# Patient Record
Sex: FEMALE | Race: WHITE | NOT HISPANIC OR LATINO
[De-identification: names, ages, dates, MRNs, and addresses within clinical notes are randomized per-mention and may not be internally consistent; named-entity substitution may affect disease eponyms.]

---

## 2020-02-10 ENCOUNTER — RESULT REVIEW (OUTPATIENT)
Age: 70
End: 2020-02-10

## 2021-03-28 DIAGNOSIS — Z01.818 ENCOUNTER FOR OTHER PREPROCEDURAL EXAMINATION: ICD-10-CM

## 2021-03-29 ENCOUNTER — APPOINTMENT (OUTPATIENT)
Dept: DISASTER EMERGENCY | Facility: CLINIC | Age: 71
End: 2021-03-29

## 2021-03-30 LAB — SARS-COV-2 N GENE NPH QL NAA+PROBE: NOT DETECTED

## 2021-07-21 ENCOUNTER — NON-APPOINTMENT (OUTPATIENT)
Age: 71
End: 2021-07-21

## 2021-07-21 ENCOUNTER — APPOINTMENT (OUTPATIENT)
Dept: INFECTIOUS DISEASE | Facility: CLINIC | Age: 71
End: 2021-07-21
Payer: MEDICARE

## 2021-07-21 VITALS
HEIGHT: 60 IN | TEMPERATURE: 99.1 F | WEIGHT: 162 LBS | BODY MASS INDEX: 31.8 KG/M2 | HEART RATE: 74 BPM | SYSTOLIC BLOOD PRESSURE: 130 MMHG | RESPIRATION RATE: 15 BRPM | DIASTOLIC BLOOD PRESSURE: 88 MMHG | OXYGEN SATURATION: 95 %

## 2021-07-21 DIAGNOSIS — E78.5 HYPERLIPIDEMIA, UNSPECIFIED: ICD-10-CM

## 2021-07-21 DIAGNOSIS — A04.72 ENTEROCOLITIS DUE TO CLOSTRIDIUM DIFFICILE, NOT SPECIFIED AS RECURRENT: ICD-10-CM

## 2021-07-21 DIAGNOSIS — M19.90 UNSPECIFIED OSTEOARTHRITIS, UNSPECIFIED SITE: ICD-10-CM

## 2021-07-21 DIAGNOSIS — R79.9 ABNORMAL FINDING OF BLOOD CHEMISTRY, UNSPECIFIED: ICD-10-CM

## 2021-07-21 DIAGNOSIS — B96.20 BACTEREMIA: ICD-10-CM

## 2021-07-21 DIAGNOSIS — R65.20 SEPSIS, UNSPECIFIED ORGANISM: ICD-10-CM

## 2021-07-21 DIAGNOSIS — R78.81 BACTEREMIA: ICD-10-CM

## 2021-07-21 DIAGNOSIS — A41.9 SEPSIS, UNSPECIFIED ORGANISM: ICD-10-CM

## 2021-07-21 PROCEDURE — 99213 OFFICE O/P EST LOW 20 MIN: CPT

## 2021-07-21 RX ORDER — LACTOBACILLUS ACIDOPHILUS/PECT 30 MG-20MG
TABLET ORAL
Refills: 0 | Status: ACTIVE | COMMUNITY

## 2021-07-21 RX ORDER — ASCORBIC ACID 500 MG
TABLET ORAL
Refills: 0 | Status: ACTIVE | COMMUNITY

## 2021-07-21 RX ORDER — HYDRALAZINE HYDROCHLORIDE 50 MG/1
50 TABLET ORAL
Refills: 0 | Status: ACTIVE | COMMUNITY

## 2021-07-21 RX ORDER — FLUTICASONE PROPIONATE 50 UG/1
50 SPRAY, METERED NASAL
Refills: 0 | Status: ACTIVE | COMMUNITY

## 2021-07-21 RX ORDER — ATORVASTATIN CALCIUM 10 MG/1
10 TABLET, FILM COATED ORAL
Refills: 0 | Status: ACTIVE | COMMUNITY

## 2021-07-21 RX ORDER — FOLIC ACID 20 MG
CAPSULE ORAL
Refills: 0 | Status: ACTIVE | COMMUNITY

## 2021-07-21 RX ORDER — THIAMINE HCL 50 MG
TABLET ORAL
Refills: 0 | Status: ACTIVE | COMMUNITY

## 2021-07-21 RX ORDER — GLUC/MSM/COLGN2/HYAL/ANTIARTH3 375-375-20
TABLET ORAL
Refills: 0 | Status: ACTIVE | COMMUNITY

## 2021-07-21 RX ORDER — CHOLECALCIFEROL (VITAMIN D3) 25 MCG
TABLET ORAL
Refills: 0 | Status: ACTIVE | COMMUNITY

## 2021-07-21 RX ORDER — SUCRALFATE 1 G/1
TABLET ORAL
Refills: 0 | Status: ACTIVE | COMMUNITY

## 2021-07-21 RX ORDER — BACILLUS COAGULANS/INULIN 1B-250 MG
CAPSULE ORAL
Refills: 0 | Status: ACTIVE | COMMUNITY

## 2021-07-21 RX ORDER — ZOLPIDEM TARTRATE 5 MG/1
TABLET, FILM COATED ORAL
Refills: 0 | Status: ACTIVE | COMMUNITY

## 2021-07-21 RX ORDER — BUDESONIDE AND FORMOTEROL FUMARATE DIHYDRATE 160; 4.5 UG/1; UG/1
AEROSOL RESPIRATORY (INHALATION)
Refills: 0 | Status: ACTIVE | COMMUNITY

## 2021-07-21 NOTE — ASSESSMENT
[FreeTextEntry1] : 70 y/o woman s/p recent admission with E. Coli bacteremia secondary to a UTI - completed Cipro \par Relapsed recurrent C diff -- completed Dificid \par Asymptomatic- feels well \par Blood work - with mildly elevated LFTs and creatinine - done by her PCP \par \par Rec:\par \par 1. Repeat blood work \par 2. No further Abx needed at this time \par 3. watch for  C diff recurrence with future Abx use \par \par plan above d/w patient \par

## 2021-07-21 NOTE — REASON FOR VISIT
[Post Hospitalization] : a post hospitalization visit [FreeTextEntry1] : fu from hospital stay ([facility]) for sepsis\par PO Cipro/Dificid completed last week \par pt only c/o fatigue\par saw PCP and had bw done

## 2021-07-21 NOTE — HISTORY OF PRESENT ILLNESS
[FreeTextEntry1] : PAtient here for hospital follow up \par \par Discharged on po cipro and Dificid -- completed both last week \par \par feels well \par offers no complaints\par \par No diarrhea \par No urinary complaints \par \par Had blood work done at her PCP's office was concerned about mildly elevated creatinine, LFTs, anemia \par

## 2021-07-21 NOTE — PHYSICAL EXAM
[General Appearance - Alert] : alert [General Appearance - In No Acute Distress] : in no acute distress [General Appearance - Well Nourished] : well nourished [Sclera] : the sclera and conjunctiva were normal [Extraocular Movements] : extraocular movements were intact [Outer Ear] : the ears and nose were normal in appearance [Examination Of The Oral Cavity] : the lips and gums were normal [Neck Appearance] : the appearance of the neck was normal [Respiration, Rhythm And Depth] : normal respiratory rhythm and effort [Auscultation Breath Sounds / Voice Sounds] : lungs were clear to auscultation bilaterally [Heart Rate And Rhythm] : heart rate was normal and rhythm regular [Heart Sounds] : normal S1 and S2 [Edema] : there was no peripheral edema [Bowel Sounds] : normal bowel sounds [Abdomen Tenderness] : non-tender [Abdomen Soft] : soft [Costovertebral Angle Tenderness] : no CVA tenderness [Musculoskeletal - Swelling] : no joint swelling [Range of Motion to Joints] : range of motion to joints [Skin Color & Pigmentation] : normal skin color and pigmentation [] : no rash [Sensation] : the sensory exam was normal to light touch and pinprick [Motor Exam] : the motor exam was normal [Oriented To Time, Place, And Person] : oriented to person, place, and time [Affect] : the affect was normal

## 2022-04-08 ENCOUNTER — APPOINTMENT (OUTPATIENT)
Dept: ORTHOPEDIC SURGERY | Facility: CLINIC | Age: 72
End: 2022-04-08
Payer: MEDICARE

## 2022-04-08 VITALS — WEIGHT: 162 LBS | HEIGHT: 60 IN | BODY MASS INDEX: 31.8 KG/M2

## 2022-04-08 DIAGNOSIS — R22.42 LOCALIZED SWELLING, MASS AND LUMP, LEFT LOWER LIMB: ICD-10-CM

## 2022-04-08 DIAGNOSIS — R60.9 EDEMA, UNSPECIFIED: ICD-10-CM

## 2022-04-08 PROCEDURE — 99214 OFFICE O/P EST MOD 30 MIN: CPT

## 2022-04-08 NOTE — DISCUSSION/SUMMARY
[Medication Risks Reviewed] : Medication risks reviewed [de-identified] : 71-year-old female who is now more than one year removed from left reverse shoulder arthroplasty was done as a revision.\par Patient had some pain after surgery more than I would expect and a diffuse degenerative cervical issue was identified.\par Patient has been seeing Dr. Frankenberger for epidural injections and has had no alleviation of pain\par She will continue to follow up with Dr. Frankenberger\par \par With respect to her left knee, patient c/o of a constant and severe pain\par Previous glenohumeral injection provided moderate relief for the patient\par Previous series of orthovisc injection provided moderate relief.\par Patient will follow up as needed for her shoulder.\par \par She presents today with a new onset of left foot swelling x1 week. Denies any specific or injury\par Patient was seen by Jimmy JONES, her PCP, and Dr. Frankenberger all with a negative workup\par She has been ruled out for DVT. Discussed could be Lyme disease and to check battery of tests from hospital\par She denies any pain when walking or weight bearing.\par Recommended to wear a compression sleeve for her foot/ankle, continue to elevate her foot and move her ankle\par Follow up as needed \par \par \par \par (1) We discussed a comprehensive treatment plans that included a prescription management plan involving the use of prescription strength medications to include Ibuprofen 600-800 mg TID, versus 500-650 mg Tylenol. We also discussed prescribing topical diclofenac (Voltaren gel) as well as once daily Meloxicam 15 mg.\par (2) The patient has More Than One chronic injuries/illnesses as outlined, discussed, and documented by ICD 10 codes listed, as well as the HPI and Plan section.\par There is a moderate risk of morbidity with further treatment, especially from use of prescription strength medications and possible side effects of these medications which include upset stomach and cardiac/renal issues with long term use were discussed.\par (3) I recommended that the patient follow-up with their medical physician to discuss any significant specific potential issues with long term use such as interactions with current medications or with exacerbation of underlying medical morbidities. \par \par Attestation:\par I, Dana Jessica , attest that this documentation has been prepared under the direction and in the presence of Provider Huber Bain MD.\par The documentation recorded by the scribe, in my presence, accurately reflects the service I personally performed, and the decisions made by me with my edits as appropriate.\par Huber Bain MD\par \par \par

## 2022-04-08 NOTE — PHYSICAL EXAM
[de-identified] : Constitutional: The general appearance of the patient is well developed, well nourished, no deformities and well groomed. Normal \par \par Gait: Gait and function is as follows: normal gait. \par \par Skin: Head and neck visualized skin is normal. Left upper extremity visualized skin is normal. Right upper extremity visualized skin is normal. Thoracic Skin of the thoracic spine shows visualized skin is normal. \par \par Cardiovascular: palpable radial pulse bilaterally, good capillary refill in digits of the bilateral upper extremities and no temperature or color changes in the bilateral upper extremities. \par \par Lymphatic: Normal Palpation of lymph nodes in the cervical. \par \par Neurologic: fine motor control in the bilateral upper extremities is intact. Deep Tendon Reflexes in Upper and Lower Extremities Negative Mathew's in the bilateral upper extremities. The patient is oriented to time, place and person. Sensation to light touch intact in the bilateral upper extremities. Mood and Affect is normal. \par \par Left Knee: Inspection of the knee is as follows: mild effusion. no ecchymosis and no streaking. Palpation of the knee is as follows: medial joint line tenderness and patella tendon tenderness. Knee Range of Motion is as follows: Range of motion is limited secondary to pain. Strength examination of the knee is as follows: Quadriceps strength is 5/5 Hamstring strength is 5/5 Ligament Stability and Special Test ligamentously stable. positive mcmurrays. Neurological examination of the knee is as follows: light touch is intact throughout. Gait and function is as follows: normal gait\par Right Shoulder:  Inspection of the shoulder/upper arm is as follows: There is a full, pain-free, stable range of motion of the shoulder with normal strength and no tenderness to palpation. \par \par Left Shoulder: Inspection of the shoulder/upper arm is as follows: no scapula winging, no biceps deformity and no AC joint deformity. Palpation of the shoulder/upper arm is as follows: There is tenderness at the proximal biceps tendon. Range of motion of the shoulder is as follows: Pain with internal rotation, external rotation, abduction and forward flexion. Strength of the shoulder is as follows: Supraspinatus 4/5. External Rotation 4/5. Internal Rotation 4/5. Deltoid 5/5 Ligament Stability and Special Tests of the shoulder is as follows: Neer test is positive. Anderson' test is positive. Speed's test is positive. \par \par Neck: \par Inspection / Palpation of the cervical spine is as follows: There is a full, pain free, stable range of motion of the cervical spine with normal tone and no tenderness to palpation. \par \par Back, including spine: Inspection / Palpation of the thoracic/lumbar spine is as follows: There is a full, pain free, stable range of motion of the thoracic spine with a normal tone and not tenderness to palpation.. [Left] : left foot and ankle [] : no gross deformity [Moderate] : moderate diffused ankle swelling

## 2022-04-08 NOTE — HISTORY OF PRESENT ILLNESS
[de-identified] : 70-year-old female who underwent reverse shoulder arthroplasty proximally 5 months ago. She had 2 previous rotator cuff repairs by Dr. Etienne which restored. Patient reports that prior to her reverse shoulder arthroplasty she had pain at rest and severe pain with trying to use the arm.\par Pain did not radiate into the hands and fingers. Patient had no diagnosis of herniated disc in the cervical region.\par Patient notes significant pain after the reverse shoulder arthroplasty which has been getting significantly worse.\par ESR and CRP were within normal limits. She denies fevers sweats chills erythema or wound dehiscence issues.\par Patient's  who is a personal friend brought her in for a second opinion regarding the etiology of her pain. Of note she did have a CT scan done which was read as posterior inferior glenoid fracture as well as lateral calcar humerus fracture.\par  The patient is Right handed.\par 7/28/20: patient returns today for follow up of ongoing left shoulder pain. Her pain is now constant and severe. She continues to report difficulty with activities of daily living. SHe presents to discuss surgery.\par 8/14/20: patient presents 10 days s/o left revision reverse shoulder arthroplasty with removal of previous rotator cuff anchor.\par \par 8/28/20: patient presents 4 weeks s/o left revision reverse shoulder arthroplasty with removal of previous rotator cuff anchor. The patient is doing well post operatively. She also presents with additional complaint of left knee pain. She has history of OA and reports recent exacerbation over the last 2-3 weeks. Pain is worse with walking long distances.\par 9/11/20: Patient returns today for follow up of left knee pain. Previous cortisone injection did not provide significant relief. She continues to report difficulty. She is interested in discussing further treatment options.\par patient is also recovering from left revision reverse shoulder arthroplasty. Some discomfort in the anterolateral aspect of the shoulder.\par 9/18/20: Patient returns today for follow up of left knee pain. She reports mild relief thus far from the first of her lubricating injections. She presents today for the 2nd of those injections.\par 9/25/20: Patient returns today for follow up of left knee pain. She reports mild relief thus far from her lubricating injections. She presents today for the 3rd of those injections.\par 10/5/20: Patient returns today for follow up regarding her neck and left shoulder pain. She denies any discrete trauma. She reports a significant atraumatic exacerbation of her pain after the weekend. She has been wearing a sling for immobilization of her LUE.\par \par 2/26/21: 6 months status post revision left reverse arthroplasty doing well. Also bilateral knee pain.\par She had an exacerbation of superior and lateral pain. She is exacerbation of neck pain and radicular symptoms.\par \par 5/24/21: Patient presents today for evaluation for recent left knee exacerbation. patient was last treated with orthovisc more than 6 months ago. She did report moderate improvement. Currently pain is constant. Pain is to medial aspect of her knee.\par \par 5/28/21: patient reports Orthovisc to her left knee last visit helped. She is here to discuss the possibility of a second injection.\par \par 6/4/21: patient presents to discuss her ongoing worsening left knee pain. She is here to discuss the possibility of proceeding with third Orthovisc injection to left knee.\par \par 8/9/21:patient reports that she was in the hospital with sepsis. Even prior to that however her shoulder has become increasingly more painful in the anterior aspect of the shoulder. She did have dramatic relief with prior epidural steroid injection.\par \par 8/16/21: patient reports a flareup of pain just lateral to the sternum on the left side I got worse with therapy. She is moving her left shoulder fluidly.\par \par 12/3/21: Patient returns with left knee pain. States a constant and severe pain. With respect to her neck and shoulder, she has been seeing Dr. Frankenberger for epidural injections and has no alleviation of pain.\par \par 12/10/21: Patient presents with ongoing left knee pain. Previous glenohumeral injection to her right knee provided moderate relief. She is here to discuss series of orthovisc injections .\par \par 12/17/21: patient presents with ongoing left knee pain. Previous orthovisc injection provided mild relief for the patient. She is here to discuss second orthovisc injections.\par \par 12/27/21: Patient presents with ongoing left knee pain. her previous orthovisc injection provided moderate relief for the patient. States no negative reactions. She is here to discuss third orthovisc injection.\par \par 4/8/22: Patient presents with new onset of left foot swelling x1 week. She denies any pain with walking or weight bearing. She was seen at [facility], by her PCP and Dr. Frankenberger who could not diagnose her left foot swelling. No swelling to her right foot. She soaked her foot in epsom salt and elevated it with no relief.

## 2022-05-06 ENCOUNTER — APPOINTMENT (OUTPATIENT)
Dept: ORTHOPEDIC SURGERY | Facility: CLINIC | Age: 72
End: 2022-05-06
Payer: MEDICARE

## 2022-05-06 VITALS — HEIGHT: 60 IN | BODY MASS INDEX: 31.8 KG/M2 | WEIGHT: 162 LBS

## 2022-05-06 PROCEDURE — 20611 DRAIN/INJ JOINT/BURSA W/US: CPT | Mod: LT

## 2022-05-06 PROCEDURE — 99214 OFFICE O/P EST MOD 30 MIN: CPT | Mod: 25,57

## 2022-05-06 NOTE — HISTORY OF PRESENT ILLNESS
[de-identified] : 70-year-old female who underwent reverse shoulder arthroplasty proximally 5 months ago. She had 2 previous rotator cuff repairs by Dr. Etienne which restored. Patient reports that prior to her reverse shoulder arthroplasty she had pain at rest and severe pain with trying to use the arm.\par Pain did not radiate into the hands and fingers. Patient had no diagnosis of herniated disc in the cervical region.\par Patient notes significant pain after the reverse shoulder arthroplasty which has been getting significantly worse.\par ESR and CRP were within normal limits. She denies fevers sweats chills erythema or wound dehiscence issues.\par Patient's  who is a personal friend brought her in for a second opinion regarding the etiology of her pain. Of note she did have a CT scan done which was read as posterior inferior glenoid fracture as well as lateral calcar humerus fracture.\par  The patient is Right handed.\par 7/28/20: patient returns today for follow up of ongoing left shoulder pain. Her pain is now constant and severe. She continues to report difficulty with activities of daily living. SHe presents to discuss surgery.\par 8/14/20: patient presents 10 days s/o left revision reverse shoulder arthroplasty with removal of previous rotator cuff anchor.\par \par 8/28/20: patient presents 4 weeks s/o left revision reverse shoulder arthroplasty with removal of previous rotator cuff anchor. The patient is doing well post operatively. She also presents with additional complaint of left knee pain. She has history of OA and reports recent exacerbation over the last 2-3 weeks. Pain is worse with walking long distances.\par 9/11/20: Patient returns today for follow up of left knee pain. Previous cortisone injection did not provide significant relief. She continues to report difficulty. She is interested in discussing further treatment options.\par patient is also recovering from left revision reverse shoulder arthroplasty. Some discomfort in the anterolateral aspect of the shoulder.\par 9/18/20: Patient returns today for follow up of left knee pain. She reports mild relief thus far from the first of her lubricating injections. She presents today for the 2nd of those injections.\par 9/25/20: Patient returns today for follow up of left knee pain. She reports mild relief thus far from her lubricating injections. She presents today for the 3rd of those injections.\par 10/5/20: Patient returns today for follow up regarding her neck and left shoulder pain. She denies any discrete trauma. She reports a significant atraumatic exacerbation of her pain after the weekend. She has been wearing a sling for immobilization of her LUE.\par \par 2/26/21: 6 months status post revision left reverse arthroplasty doing well. Also bilateral knee pain.\par She had an exacerbation of superior and lateral pain. She is exacerbation of neck pain and radicular symptoms.\par \par 5/24/21: Patient presents today for evaluation for recent left knee exacerbation. patient was last treated with orthovisc more than 6 months ago. She did report moderate improvement. Currently pain is constant. Pain is to medial aspect of her knee.\par \par 5/28/21: patient reports Orthovisc to her left knee last visit helped. She is here to discuss the possibility of a second injection.\par \par 6/4/21: patient presents to discuss her ongoing worsening left knee pain. She is here to discuss the possibility of proceeding with third Orthovisc injection to left knee.\par \par 8/9/21:patient reports that she was in the hospital with sepsis. Even prior to that however her shoulder has become increasingly more painful in the anterior aspect of the shoulder. She did have dramatic relief with prior epidural steroid injection.\par \par 8/16/21: patient reports a flareup of pain just lateral to the sternum on the left side I got worse with therapy. She is moving her left shoulder fluidly.\par \par 12/3/21: Patient returns with left knee pain. States a constant and severe pain. With respect to her neck and shoulder, she has been seeing Dr. Frankenberger for epidural injections and has no alleviation of pain.\par \par 12/10/21: Patient presents with ongoing left knee pain. Previous glenohumeral injection to her right knee provided moderate relief. She is here to discuss series of orthovisc injections .\par \par 12/17/21: patient presents with ongoing left knee pain. Previous orthovisc injection provided mild relief for the patient. She is here to discuss second orthovisc injections.\par \par 12/27/21: Patient presents with ongoing left knee pain. her previous orthovisc injection provided moderate relief for the patient. States no negative reactions. She is here to discuss third orthovisc injection.\par \par 4/8/22: Patient presents with new onset of left foot swelling x1 week. She denies any pain with walking or weight bearing. She was seen at [facility], by her PCP and Dr. Frankenberger who could not diagnose her left foot swelling. No swelling to her right foot. She soaked her foot in epsom salt and elevated it with no relief.

## 2022-05-06 NOTE — DISCUSSION/SUMMARY
[Medication Risks Reviewed] : Medication risks reviewed [de-identified] : 71-year-old female who is now more than one year removed from left reverse shoulder arthroplasty was done as a revision.\par Patient had some pain after surgery more than I would expect and a diffuse degenerative cervical issue was identified.\par Patient has been seeing Dr. Frankenberger for epidural injections and has had no alleviation of pain\par She will continue to follow up with Dr. Frankenberger\par Patient will follow up as needed for her shoulder.\par \par With respect to her left knee, patient c/o of a constant and severe pain\par Previous series of orthovisc injection provided moderate relief.\par Patient was treated today with US guided cortisone injection for diagnostic and therapeutic purposes\par Follow up as needed\par \par \par (1) We discussed a comprehensive treatment plans that included a prescription management plan involving the use of prescription strength medications to include Ibuprofen 600-800 mg TID, versus 500-650 mg Tylenol. We also discussed prescribing topical diclofenac (Voltaren gel) as well as once daily Meloxicam 15 mg.\par (2) The patient has More Than One chronic injuries/illnesses as outlined, discussed, and documented by ICD 10 codes listed, as well as the HPI and Plan section.\par There is a moderate risk of morbidity with further treatment, especially from use of prescription strength medications and possible side effects of these medications which include upset stomach and cardiac/renal issues with long term use were discussed.\par (3) I recommended that the patient follow-up with their medical physician to discuss any significant specific potential issues with long term use such as interactions with current medications or with exacerbation of underlying medical morbidities. \par \par Attestation:\par I, Dana Jessica , attest that this documentation has been prepared under the direction and in the presence of Provider Huber Bain MD.\par The documentation recorded by the scribe, in my presence, accurately reflects the service I personally performed, and the decisions made by me with my edits as appropriate.\par Huber Bain MD\par \par \par

## 2022-05-06 NOTE — PHYSICAL EXAM
[de-identified] : Constitutional: The general appearance of the patient is well developed, well nourished, no deformities and well groomed. Normal \par \par Gait: Gait and function is as follows: normal gait. \par \par Skin: Head and neck visualized skin is normal. Left upper extremity visualized skin is normal. Right upper extremity visualized skin is normal. Thoracic Skin of the thoracic spine shows visualized skin is normal. \par \par Cardiovascular: palpable radial pulse bilaterally, good capillary refill in digits of the bilateral upper extremities and no temperature or color changes in the bilateral upper extremities. \par \par Lymphatic: Normal Palpation of lymph nodes in the cervical. \par \par Neurologic: fine motor control in the bilateral upper extremities is intact. Deep Tendon Reflexes in Upper and Lower Extremities Negative Mathew's in the bilateral upper extremities. The patient is oriented to time, place and person. Sensation to light touch intact in the bilateral upper extremities. Mood and Affect is normal. \par \par Left Knee: Inspection of the knee is as follows: mild effusion. no ecchymosis and no streaking. Palpation of the knee is as follows: medial joint line tenderness and patella tendon tenderness. Knee Range of Motion is as follows: Range of motion is limited secondary to pain. Strength examination of the knee is as follows: Quadriceps strength is 5/5 Hamstring strength is 5/5 Ligament Stability and Special Test ligamentously stable. positive mcmurrays. Neurological examination of the knee is as follows: light touch is intact throughout. Gait and function is as follows: normal gait\par Right Shoulder:  Inspection of the shoulder/upper arm is as follows: There is a full, pain-free, stable range of motion of the shoulder with normal strength and no tenderness to palpation. \par \par Left Shoulder: Inspection of the shoulder/upper arm is as follows: no scapula winging, no biceps deformity and no AC joint deformity. Palpation of the shoulder/upper arm is as follows: There is tenderness at the proximal biceps tendon. Range of motion of the shoulder is as follows: Pain with internal rotation, external rotation, abduction and forward flexion. Strength of the shoulder is as follows: Supraspinatus 4/5. External Rotation 4/5. Internal Rotation 4/5. Deltoid 5/5 Ligament Stability and Special Tests of the shoulder is as follows: Neer test is positive. Anderson' test is positive. Speed's test is positive. \par \par Neck: \par Inspection / Palpation of the cervical spine is as follows: There is a full, pain free, stable range of motion of the cervical spine with normal tone and no tenderness to palpation. \par \par Back, including spine: Inspection / Palpation of the thoracic/lumbar spine is as follows: There is a full, pain free, stable range of motion of the thoracic spine with a normal tone and not tenderness to palpation..

## 2022-05-06 NOTE — PROCEDURE
[Large Joint Injection] : Large joint injection [Left] : of the left [Knee] : knee [FreeTextEntry3] : Large Joint Injection was performed because of pain and inflammation. Anesthesia: ethyl chloride sprayed topically.. \par Depomedrol: An injection of Depomedrol 40 mg , 1 cc. \par Lidocaine: 4 cc. \par \par Medication was injected in the left knee. Patient has tried OTC's including aspirin, Ibuprofen, Aleve etc or prescription NSAIDS, and/or exercises at home and/ or physical therapy without satisfactory response and Patient has decreased mobility in the joint. After verbal consent using sterile preparation and technique. The risks, benefits, and alternatives to cortisone injection were explained in full to the patient. Risks outlined include but are not limited to infection, sepsis, bleeding, scarring, skin discoloration, temporary increase in pain, syncopal episode, failure to resolve symptoms, allergic reaction, symptom recurrence, and elevation of blood sugar in diabetics. Patient understood the risks. All questions were answered. After discussion of options, patient requested an injection. Oral informed consent was obtained and sterile prep was done of the injection site. Sterile technique was utilized for the procedure including the preparation of the solutions used for the injection. Patient tolerated the procedure well. Advised to ice the injection site this evening. Prep with alcohol locally to site. Sterile technique used. Patient tolerated procedure well. Post Procedure Instructions: Patient was advised to call if redness, pain, or fever occur and apply ice for 15 min. out of every hour for the next 12-24 hours as tolerated. patient was advised to rest the joint(s) for 7 days. \par \par \par \par Ultrasound Guidance was used for the following reasons: for precise injection in area of tear and prior failure or difficult injection. \par \par \par \par Ultrasound guided injection was performed of the knee, visualization of the needle and placement of injection was performed without complication. \par \par \par

## 2022-06-27 ENCOUNTER — APPOINTMENT (OUTPATIENT)
Dept: ORTHOPEDIC SURGERY | Facility: CLINIC | Age: 72
End: 2022-06-27

## 2022-06-27 VITALS — HEIGHT: 60 IN | WEIGHT: 162 LBS | BODY MASS INDEX: 31.8 KG/M2

## 2022-06-27 PROCEDURE — 99214 OFFICE O/P EST MOD 30 MIN: CPT | Mod: 57,25

## 2022-06-27 PROCEDURE — 20611 DRAIN/INJ JOINT/BURSA W/US: CPT | Mod: LT

## 2022-06-27 NOTE — DISCUSSION/SUMMARY
[Medication Risks Reviewed] : Medication risks reviewed [de-identified] : 71-year-old female who is now more than one year removed from left reverse shoulder arthroplasty was done as a revision.\par Patient had some pain after surgery more than I would expect and a diffuse degenerative cervical issue was identified.\par Patient has been seeing Dr. Frankenberger for epidural injections and has had no alleviation of pain\par She will continue to follow up with Dr. Frankenberger\par Patient will follow up as needed for her shoulder.\par \par With respect to her left knee, patient c/o of a constant and severe pain\par Patient has experienced relief with previous series of orthovisc injection and cortisone injections\par After a long discussion was had with the patient outlining risks/benefits we decided to proceed with a series of orthovisc injections. \par Patient was treated today with injection #1 to the left knee. Tolerated the injection well. \par Advised to contact the office or presents to ER should they develop any swelling, erythema or fever. \par Patient will follow up in 1 week. \par \par \par \par (1) We discussed a comprehensive treatment plans that included a prescription management plan involving the use of prescription strength medications to include Ibuprofen 600-800 mg TID, versus 500-650 mg Tylenol. We also discussed prescribing topical diclofenac (Voltaren gel) as well as once daily Meloxicam 15 mg.\par (2) The patient has More Than One chronic injuries/illnesses as outlined, discussed, and documented by ICD 10 codes listed, as well as the HPI and Plan section.\par There is a moderate risk of morbidity with further treatment, especially from use of prescription strength medications and possible side effects of these medications which include upset stomach and cardiac/renal issues with long term use were discussed.\par (3) I recommended that the patient follow-up with their medical physician to discuss any significant specific potential issues with long term use such as interactions with current medications or with exacerbation of underlying medical morbidities. \par \par Attestation:\par I, Dana Jessica , attest that this documentation has been prepared under the direction and in the presence of Provider Huber Bain MD.\par The documentation recorded by the scribe, in my presence, accurately reflects the service I personally performed, and the decisions made by me with my edits as appropriate.\par Huber Bain MD\par \par \par

## 2022-06-27 NOTE — PHYSICAL EXAM
[de-identified] : Constitutional: The general appearance of the patient is well developed, well nourished, no deformities and well groomed. Normal \par \par Gait: Gait and function is as follows: normal gait. \par \par Skin: Head and neck visualized skin is normal. Left upper extremity visualized skin is normal. Right upper extremity visualized skin is normal. Thoracic Skin of the thoracic spine shows visualized skin is normal. \par \par Cardiovascular: palpable radial pulse bilaterally, good capillary refill in digits of the bilateral upper extremities and no temperature or color changes in the bilateral upper extremities. \par \par Lymphatic: Normal Palpation of lymph nodes in the cervical. \par \par Neurologic: fine motor control in the bilateral upper extremities is intact. Deep Tendon Reflexes in Upper and Lower Extremities Negative Mathew's in the bilateral upper extremities. The patient is oriented to time, place and person. Sensation to light touch intact in the bilateral upper extremities. Mood and Affect is normal. \par \par Left Knee: Inspection of the knee is as follows: mild effusion. no ecchymosis and no streaking. Palpation of the knee is as follows: medial joint line tenderness and patella tendon tenderness. Knee Range of Motion is as follows: Range of motion is limited secondary to pain. Strength examination of the knee is as follows: Quadriceps strength is 5/5 Hamstring strength is 5/5 Ligament Stability and Special Test ligamentously stable. positive mcmurrays. Neurological examination of the knee is as follows: light touch is intact throughout. Gait and function is as follows: normal gait\par Right Shoulder:  Inspection of the shoulder/upper arm is as follows: There is a full, pain-free, stable range of motion of the shoulder with normal strength and no tenderness to palpation. \par \par Left Shoulder: Inspection of the shoulder/upper arm is as follows: no scapula winging, no biceps deformity and no AC joint deformity. Palpation of the shoulder/upper arm is as follows: There is tenderness at the proximal biceps tendon. Range of motion of the shoulder is as follows: Pain with internal rotation, external rotation, abduction and forward flexion. Strength of the shoulder is as follows: Supraspinatus 4/5. External Rotation 4/5. Internal Rotation 4/5. Deltoid 5/5 Ligament Stability and Special Tests of the shoulder is as follows: Neer test is positive. Anderson' test is positive. Speed's test is positive. \par \par Neck: \par Inspection / Palpation of the cervical spine is as follows: There is a full, pain free, stable range of motion of the cervical spine with normal tone and no tenderness to palpation. \par \par Back, including spine: Inspection / Palpation of the thoracic/lumbar spine is as follows: There is a full, pain free, stable range of motion of the thoracic spine with a normal tone and not tenderness to palpation..

## 2022-06-27 NOTE — HISTORY OF PRESENT ILLNESS
[de-identified] : 70-year-old female who underwent reverse shoulder arthroplasty proximally 5 months ago. She had 2 previous rotator cuff repairs by Dr. Etienne which restored. Patient reports that prior to her reverse shoulder arthroplasty she had pain at rest and severe pain with trying to use the arm.\par Pain did not radiate into the hands and fingers. Patient had no diagnosis of herniated disc in the cervical region.\par Patient notes significant pain after the reverse shoulder arthroplasty which has been getting significantly worse.\par ESR and CRP were within normal limits. She denies fevers sweats chills erythema or wound dehiscence issues.\par Patient's  who is a personal friend brought her in for a second opinion regarding the etiology of her pain. Of note she did have a CT scan done which was read as posterior inferior glenoid fracture as well as lateral calcar humerus fracture.\par  The patient is Right handed.\par 7/28/20: patient returns today for follow up of ongoing left shoulder pain. Her pain is now constant and severe. She continues to report difficulty with activities of daily living. SHe presents to discuss surgery.\par 8/14/20: patient presents 10 days s/o left revision reverse shoulder arthroplasty with removal of previous rotator cuff anchor.\par \par 8/28/20: patient presents 4 weeks s/o left revision reverse shoulder arthroplasty with removal of previous rotator cuff anchor. The patient is doing well post operatively. She also presents with additional complaint of left knee pain. She has history of OA and reports recent exacerbation over the last 2-3 weeks. Pain is worse with walking long distances.\par 9/11/20: Patient returns today for follow up of left knee pain. Previous cortisone injection did not provide significant relief. She continues to report difficulty. She is interested in discussing further treatment options.\par patient is also recovering from left revision reverse shoulder arthroplasty. Some discomfort in the anterolateral aspect of the shoulder.\par 9/18/20: Patient returns today for follow up of left knee pain. She reports mild relief thus far from the first of her lubricating injections. She presents today for the 2nd of those injections.\par 9/25/20: Patient returns today for follow up of left knee pain. She reports mild relief thus far from her lubricating injections. She presents today for the 3rd of those injections.\par 10/5/20: Patient returns today for follow up regarding her neck and left shoulder pain. She denies any discrete trauma. She reports a significant atraumatic exacerbation of her pain after the weekend. She has been wearing a sling for immobilization of her LUE.\par \par 2/26/21: 6 months status post revision left reverse arthroplasty doing well. Also bilateral knee pain.\par She had an exacerbation of superior and lateral pain. She is exacerbation of neck pain and radicular symptoms.\par \par 5/24/21: Patient presents today for evaluation for recent left knee exacerbation. patient was last treated with orthovisc more than 6 months ago. She did report moderate improvement. Currently pain is constant. Pain is to medial aspect of her knee.\par \par 5/28/21: patient reports Orthovisc to her left knee last visit helped. She is here to discuss the possibility of a second injection.\par \par 6/4/21: patient presents to discuss her ongoing worsening left knee pain. She is here to discuss the possibility of proceeding with third Orthovisc injection to left knee.\par \par 8/9/21:patient reports that she was in the hospital with sepsis. Even prior to that however her shoulder has become increasingly more painful in the anterior aspect of the shoulder. She did have dramatic relief with prior epidural steroid injection.\par \par 8/16/21: patient reports a flareup of pain just lateral to the sternum on the left side I got worse with therapy. She is moving her left shoulder fluidly.\par \par 12/3/21: Patient returns with left knee pain. States a constant and severe pain. With respect to her neck and shoulder, she has been seeing Dr. Frankenberger for epidural injections and has no alleviation of pain.\par \par 12/10/21: Patient presents with ongoing left knee pain. Previous glenohumeral injection to her right knee provided moderate relief. She is here to discuss series of orthovisc injections .\par \par 12/17/21: patient presents with ongoing left knee pain. Previous orthovisc injection provided mild relief for the patient. She is here to discuss second orthovisc injections.\par \par 12/27/21: Patient presents with ongoing left knee pain. her previous orthovisc injection provided moderate relief for the patient. States no negative reactions. She is here to discuss third orthovisc injection.\par \par 4/8/22: Patient presents with new onset of left foot swelling x1 week. She denies any pain with walking or weight bearing. She was seen at [facility], by her PCP and Dr. Frankenberger who could not diagnose her left foot swelling. No swelling to her right foot. She soaked her foot in epsom salt and elevated it with no relief. \par \par 6/27/22: Patient presents for repeat evaluation of left knee pain. She experienced previous relief with previous series of orthovisc. Patient continues to have pain worsening over the last few days. She presents today to discuss first injection of orthovisc.

## 2022-06-27 NOTE — PROCEDURE
[Large Joint Injection] : Large joint injection [Left] : of the left [Knee] : knee [Orthovisc] : Orthovisc [#1] : series #1 [FreeTextEntry3] : Viscosupplementation Injection: X-ray evidence of Osteoarthritis on this or prior visit, Patient has tried OTC's including aspirin, Ibuprofen, Aleve etc or prescription NSAIDS, and/or exercises at home and/ or physical therapy without satisfactory response and Repeat series performed because patient had significant improvement in their pain and functional capacity from prior series which was given more than six months ago.\par An injection of Orthovisc 2ml #1 was injected after verbal consent using sterile technique. The risks, benefits, and alternatives to Viscosupplementation injection were explained in full to the patient. Risks outlined include but are not limited to infection, sepsis, bleeding, scarring, skin discoloration, temporary increase in pain, syncopal episode, failure to resolve symptoms, allergic reaction, and symptom recurrence. Signs and symptoms of infection reviewed and patient advised to call immediately for redness, fevers, and/or chills. Patient understood the risks. All questions were answered. After discussion of options, patient requested Viscosupplementation. Oral informed consent was obtained and sterile prep was done of the injection site. Sterile technique was used without complications. The patient tolerated the procedure well. Ice tonight to the injection site.\par \par \par \par Ultrasound Guidance was used for the following reasons: for Glenohumeral injection.\par \par \par \par Ultrasound guided injection was performed of the shoulder, visualization of the needle and placement of injection was performed without complication.\par

## 2022-07-08 ENCOUNTER — APPOINTMENT (OUTPATIENT)
Dept: ORTHOPEDIC SURGERY | Facility: CLINIC | Age: 72
End: 2022-07-08

## 2022-07-08 PROCEDURE — 20611 DRAIN/INJ JOINT/BURSA W/US: CPT | Mod: LT

## 2022-07-08 PROCEDURE — 99214 OFFICE O/P EST MOD 30 MIN: CPT | Mod: 25,57

## 2022-07-08 NOTE — HISTORY OF PRESENT ILLNESS
[de-identified] : 70-year-old female who underwent reverse shoulder arthroplasty proximally 5 months ago. She had 2 previous rotator cuff repairs by Dr. Etienne which restored. Patient reports that prior to her reverse shoulder arthroplasty she had pain at rest and severe pain with trying to use the arm.\par Pain did not radiate into the hands and fingers. Patient had no diagnosis of herniated disc in the cervical region.\par Patient notes significant pain after the reverse shoulder arthroplasty which has been getting significantly worse.\par ESR and CRP were within normal limits. She denies fevers sweats chills erythema or wound dehiscence issues.\par Patient's  who is a personal friend brought her in for a second opinion regarding the etiology of her pain. Of note she did have a CT scan done which was read as posterior inferior glenoid fracture as well as lateral calcar humerus fracture.\par  The patient is Right handed.\par 7/28/20: patient returns today for follow up of ongoing left shoulder pain. Her pain is now constant and severe. She continues to report difficulty with activities of daily living. SHe presents to discuss surgery.\par 8/14/20: patient presents 10 days s/o left revision reverse shoulder arthroplasty with removal of previous rotator cuff anchor.\par \par 8/28/20: patient presents 4 weeks s/o left revision reverse shoulder arthroplasty with removal of previous rotator cuff anchor. The patient is doing well post operatively. She also presents with additional complaint of left knee pain. She has history of OA and reports recent exacerbation over the last 2-3 weeks. Pain is worse with walking long distances.\par 9/11/20: Patient returns today for follow up of left knee pain. Previous cortisone injection did not provide significant relief. She continues to report difficulty. She is interested in discussing further treatment options.\par patient is also recovering from left revision reverse shoulder arthroplasty. Some discomfort in the anterolateral aspect of the shoulder.\par 9/18/20: Patient returns today for follow up of left knee pain. She reports mild relief thus far from the first of her lubricating injections. She presents today for the 2nd of those injections.\par 9/25/20: Patient returns today for follow up of left knee pain. She reports mild relief thus far from her lubricating injections. She presents today for the 3rd of those injections.\par 10/5/20: Patient returns today for follow up regarding her neck and left shoulder pain. She denies any discrete trauma. She reports a significant atraumatic exacerbation of her pain after the weekend. She has been wearing a sling for immobilization of her LUE.\par \par 2/26/21: 6 months status post revision left reverse arthroplasty doing well. Also bilateral knee pain.\par She had an exacerbation of superior and lateral pain. She is exacerbation of neck pain and radicular symptoms.\par \par 5/24/21: Patient presents today for evaluation for recent left knee exacerbation. patient was last treated with orthovisc more than 6 months ago. She did report moderate improvement. Currently pain is constant. Pain is to medial aspect of her knee.\par \par 5/28/21: patient reports Orthovisc to her left knee last visit helped. She is here to discuss the possibility of a second injection.\par \par 6/4/21: patient presents to discuss her ongoing worsening left knee pain. She is here to discuss the possibility of proceeding with third Orthovisc injection to left knee.\par \par 8/9/21:patient reports that she was in the hospital with sepsis. Even prior to that however her shoulder has become increasingly more painful in the anterior aspect of the shoulder. She did have dramatic relief with prior epidural steroid injection.\par \par 8/16/21: patient reports a flareup of pain just lateral to the sternum on the left side I got worse with therapy. She is moving her left shoulder fluidly.\par \par 12/3/21: Patient returns with left knee pain. States a constant and severe pain. With respect to her neck and shoulder, she has been seeing Dr. Frankenberger for epidural injections and has no alleviation of pain.\par \par 12/10/21: Patient presents with ongoing left knee pain. Previous glenohumeral injection to her right knee provided moderate relief. She is here to discuss series of orthovisc injections .\par \par 12/17/21: patient presents with ongoing left knee pain. Previous orthovisc injection provided mild relief for the patient. She is here to discuss second orthovisc injections.\par \par 12/27/21: Patient presents with ongoing left knee pain. her previous orthovisc injection provided moderate relief for the patient. States no negative reactions. She is here to discuss third orthovisc injection.\par \par 4/8/22: Patient presents with new onset of left foot swelling x1 week. She denies any pain with walking or weight bearing. She was seen at [facility], by her PCP and Dr. Frankenberger who could not diagnose her left foot swelling. No swelling to her right foot. She soaked her foot in epsom salt and elevated it with no relief. \par \par 6/27/22: Patient presents for repeat evaluation of left knee pain. She experienced previous relief with previous series of orthovisc. Patient continues to have pain worsening over the last few days. She presents today to discuss first injection of orthovisc.\par 7/8/22: Patient presents for repeat evaluation of left knee pain. She admits mild relief from first injection of orthovisc. Denies any negative reactions. She presents to discuss third injection.

## 2022-07-08 NOTE — PROCEDURE
[Large Joint Injection] : Large joint injection [Left] : of the left [Knee] : knee [Orthovisc] : Orthovisc [#2] : series #2 [FreeTextEntry3] : Viscosupplementation Injection: X-ray evidence of Osteoarthritis on this or prior visit, Patient has tried OTC's including aspirin, Ibuprofen, Aleve etc or prescription NSAIDS, and/or exercises at home and/ or physical therapy without satisfactory response and Repeat series performed because patient had significant improvement in their pain and functional capacity from prior series which was given more than six months ago.\par An injection of Orthovisc 2ml #2 was injected after verbal consent using sterile technique. The risks, benefits, and alternatives to Viscosupplementation injection were explained in full to the patient. Risks outlined include but are not limited to infection, sepsis, bleeding, scarring, skin discoloration, temporary increase in pain, syncopal episode, failure to resolve symptoms, allergic reaction, and symptom recurrence. Signs and symptoms of infection reviewed and patient advised to call immediately for redness, fevers, and/or chills. Patient understood the risks. All questions were answered. After discussion of options, patient requested Viscosupplementation. Oral informed consent was obtained and sterile prep was done of the injection site. Sterile technique was used without complications. The patient tolerated the procedure well. Ice tonight to the injection site.\par \par \par \par Ultrasound Guidance was used for the following reasons: for Glenohumeral injection.\par \par \par \par Ultrasound guided injection was performed of the shoulder, visualization of the needle and placement of injection was performed without complication.\par

## 2022-07-08 NOTE — DISCUSSION/SUMMARY
[Medication Risks Reviewed] : Medication risks reviewed [de-identified] : 71-year-old female who is now more than one year removed from left reverse shoulder arthroplasty was done as a revision.\par Patient had some pain after surgery more than I would expect and a diffuse degenerative cervical issue was identified.\par Patient has been seeing Dr. Frankenberger for epidural injections and has had no alleviation of pain\par She will continue to follow up with Dr. Frankenberger\par Patient will follow up as needed for her shoulder.\par \par With respect to her left knee, patient c/o of a constant and severe pain\par Patient has experienced relief with previous series of orthovisc injection and cortisone injections\par After a long discussion was had with the patient outlining risks/benefits we decided to proceed with a series of orthovisc injections. \par States first injection provided little relief. Denies any negative reactions.  \par Patient was treated today with injection #2. Tolerated the injection well. \par Advised to contact the office or presents to ER should they develop any swelling, erythema or fever. \par Patient will follow up in 1 week to discuss injection #3.\par \par \par \par (1) We discussed a comprehensive treatment plans that included a prescription management plan involving the use of prescription strength medications to include Ibuprofen 600-800 mg TID, versus 500-650 mg Tylenol. We also discussed prescribing topical diclofenac (Voltaren gel) as well as once daily Meloxicam 15 mg.\par (2) The patient has More Than One chronic injuries/illnesses as outlined, discussed, and documented by ICD 10 codes listed, as well as the HPI and Plan section.\par There is a moderate risk of morbidity with further treatment, especially from use of prescription strength medications and possible side effects of these medications which include upset stomach and cardiac/renal issues with long term use were discussed.\par (3) I recommended that the patient follow-up with their medical physician to discuss any significant specific potential issues with long term use such as interactions with current medications or with exacerbation of underlying medical morbidities. \par \par Attestation:\par I, Dana Jessica , attest that this documentation has been prepared under the direction and in the presence of Provider Huber Bain MD.\par The documentation recorded by the scribe, in my presence, accurately reflects the service I personally performed, and the decisions made by me with my edits as appropriate.\par Huber Bain MD\par \par \par

## 2022-07-08 NOTE — PHYSICAL EXAM
[de-identified] : Constitutional: The general appearance of the patient is well developed, well nourished, no deformities and well groomed. Normal \par \par Gait: Gait and function is as follows: normal gait. \par \par Skin: Head and neck visualized skin is normal. Left upper extremity visualized skin is normal. Right upper extremity visualized skin is normal. Thoracic Skin of the thoracic spine shows visualized skin is normal. \par \par Cardiovascular: palpable radial pulse bilaterally, good capillary refill in digits of the bilateral upper extremities and no temperature or color changes in the bilateral upper extremities. \par \par Lymphatic: Normal Palpation of lymph nodes in the cervical. \par \par Neurologic: fine motor control in the bilateral upper extremities is intact. Deep Tendon Reflexes in Upper and Lower Extremities Negative Mathew's in the bilateral upper extremities. The patient is oriented to time, place and person. Sensation to light touch intact in the bilateral upper extremities. Mood and Affect is normal. \par \par Left Knee: Inspection of the knee is as follows: mild effusion. no ecchymosis and no streaking. Palpation of the knee is as follows: medial joint line tenderness and patella tendon tenderness. Knee Range of Motion is as follows: Range of motion is limited secondary to pain. Strength examination of the knee is as follows: Quadriceps strength is 5/5 Hamstring strength is 5/5 Ligament Stability and Special Test ligamentously stable. positive mcmurrays. Neurological examination of the knee is as follows: light touch is intact throughout. Gait and function is as follows: normal gait\par Right Shoulder:  Inspection of the shoulder/upper arm is as follows: There is a full, pain-free, stable range of motion of the shoulder with normal strength and no tenderness to palpation. \par \par Left Shoulder: Inspection of the shoulder/upper arm is as follows: no scapula winging, no biceps deformity and no AC joint deformity. Palpation of the shoulder/upper arm is as follows: There is tenderness at the proximal biceps tendon. Range of motion of the shoulder is as follows: Pain with internal rotation, external rotation, abduction and forward flexion. Strength of the shoulder is as follows: Supraspinatus 4/5. External Rotation 4/5. Internal Rotation 4/5. Deltoid 5/5 Ligament Stability and Special Tests of the shoulder is as follows: Neer test is positive. Anderson' test is positive. Speed's test is positive. \par \par Neck: \par Inspection / Palpation of the cervical spine is as follows: There is a full, pain free, stable range of motion of the cervical spine with normal tone and no tenderness to palpation. \par \par Back, including spine: Inspection / Palpation of the thoracic/lumbar spine is as follows: There is a full, pain free, stable range of motion of the thoracic spine with a normal tone and not tenderness to palpation..

## 2022-07-18 ENCOUNTER — APPOINTMENT (OUTPATIENT)
Dept: ORTHOPEDIC SURGERY | Facility: CLINIC | Age: 72
End: 2022-07-18

## 2022-07-18 DIAGNOSIS — M65.9 SYNOVITIS AND TENOSYNOVITIS, UNSPECIFIED: ICD-10-CM

## 2022-07-18 PROCEDURE — 99214 OFFICE O/P EST MOD 30 MIN: CPT | Mod: 25

## 2022-07-18 PROCEDURE — 20611 DRAIN/INJ JOINT/BURSA W/US: CPT

## 2022-07-18 NOTE — DISCUSSION/SUMMARY
[Medication Risks Reviewed] : Medication risks reviewed [de-identified] : 71-year-old female who is now more than one year removed from left reverse shoulder arthroplasty was done as a revision.\par Patient had some pain after surgery more than I would expect and a diffuse degenerative cervical issue was identified.\par Patient has been seeing Dr. Frankenberger for epidural injections and has had no alleviation of pain\par She will continue to follow up with Dr. Frankenberger\par Patient will follow up as needed for her shoulder.\par \par With respect to her left knee, patient c/o of a constant and severe pain\par Patient has experienced relief with previous series of orthovisc injection and cortisone injections\par After a long discussion was had with the patient outlining risks/benefits we decided to proceed with a series of orthovisc injections. \par States second injection provided mild relief. Denies any negative reactions. \par Patient was treated today with injection #3. Tolerated the injection well. \par Patient tolerated the injection well. \par Discussed with patient to contact the office or present to ER should they develop any swelling, fever or drainage.\par Patient will follow up as needed \par \par \par (1) We discussed a comprehensive treatment plans that included a prescription management plan involving the use of prescription strength medications to include Ibuprofen 600-800 mg TID, versus 500-650 mg Tylenol. We also discussed prescribing topical diclofenac (Voltaren gel) as well as once daily Meloxicam 15 mg.\par (2) The patient has More Than One chronic injuries/illnesses as outlined, discussed, and documented by ICD 10 codes listed, as well as the HPI and Plan section.\par There is a moderate risk of morbidity with further treatment, especially from use of prescription strength medications and possible side effects of these medications which include upset stomach and cardiac/renal issues with long term use were discussed.\par (3) I recommended that the patient follow-up with their medical physician to discuss any significant specific potential issues with long term use such as interactions with current medications or with exacerbation of underlying medical morbidities. \par \par Attestation:\par I, Dana Jessica , attest that this documentation has been prepared under the direction and in the presence of Provider Huber Bain MD.\par The documentation recorded by the scribe, in my presence, accurately reflects the service I personally performed, and the decisions made by me with my edits as appropriate.\par Huber Bain MD\par \par \par

## 2022-07-18 NOTE — PHYSICAL EXAM
[de-identified] : Constitutional: The general appearance of the patient is well developed, well nourished, no deformities and well groomed. Normal \par \par Gait: Gait and function is as follows: normal gait. \par \par Skin: Head and neck visualized skin is normal. Left upper extremity visualized skin is normal. Right upper extremity visualized skin is normal. Thoracic Skin of the thoracic spine shows visualized skin is normal. \par \par Cardiovascular: palpable radial pulse bilaterally, good capillary refill in digits of the bilateral upper extremities and no temperature or color changes in the bilateral upper extremities. \par \par Lymphatic: Normal Palpation of lymph nodes in the cervical. \par \par Neurologic: fine motor control in the bilateral upper extremities is intact. Deep Tendon Reflexes in Upper and Lower Extremities Negative Mathew's in the bilateral upper extremities. The patient is oriented to time, place and person. Sensation to light touch intact in the bilateral upper extremities. Mood and Affect is normal. \par \par Left Knee: Inspection of the knee is as follows: mild effusion. no ecchymosis and no streaking. Palpation of the knee is as follows: medial joint line tenderness and patella tendon tenderness. Knee Range of Motion is as follows: Range of motion is limited secondary to pain. Strength examination of the knee is as follows: Quadriceps strength is 5/5 Hamstring strength is 5/5 Ligament Stability and Special Test ligamentously stable. positive mcmurrays. Neurological examination of the knee is as follows: light touch is intact throughout. Gait and function is as follows: normal gait\par Right Shoulder:  Inspection of the shoulder/upper arm is as follows: There is a full, pain-free, stable range of motion of the shoulder with normal strength and no tenderness to palpation. \par \par Left Shoulder: Inspection of the shoulder/upper arm is as follows: no scapula winging, no biceps deformity and no AC joint deformity. Palpation of the shoulder/upper arm is as follows: There is tenderness at the proximal biceps tendon. Range of motion of the shoulder is as follows: Pain with internal rotation, external rotation, abduction and forward flexion. Strength of the shoulder is as follows: Supraspinatus 4/5. External Rotation 4/5. Internal Rotation 4/5. Deltoid 5/5 Ligament Stability and Special Tests of the shoulder is as follows: Neer test is positive. Anderson' test is positive. Speed's test is positive. \par \par Neck: \par Inspection / Palpation of the cervical spine is as follows: There is a full, pain free, stable range of motion of the cervical spine with normal tone and no tenderness to palpation. \par \par Back, including spine: Inspection / Palpation of the thoracic/lumbar spine is as follows: There is a full, pain free, stable range of motion of the thoracic spine with a normal tone and not tenderness to palpation..

## 2022-07-18 NOTE — PROCEDURE
[Large Joint Injection] : Large joint injection [Left] : of the left [Knee] : knee [Orthovisc] : Orthovisc [#3] : series #3 [FreeTextEntry3] : Viscosupplementation Injection: X-ray evidence of Osteoarthritis on this or prior visit, Patient has tried OTC's including aspirin, Ibuprofen, Aleve etc or prescription NSAIDS, and/or exercises at home and/ or physical therapy without satisfactory response and Repeat series performed because patient had significant improvement in their pain and functional capacity from prior series which was given more than six months ago.\par An injection of Orthovisc 2ml #3 was injected after verbal consent using sterile technique. The risks, benefits, and alternatives to Viscosupplementation injection were explained in full to the patient. Risks outlined include but are not limited to infection, sepsis, bleeding, scarring, skin discoloration, temporary increase in pain, syncopal episode, failure to resolve symptoms, allergic reaction, and symptom recurrence. Signs and symptoms of infection reviewed and patient advised to call immediately for redness, fevers, and/or chills. Patient understood the risks. All questions were answered. After discussion of options, patient requested Viscosupplementation. Oral informed consent was obtained and sterile prep was done of the injection site. Sterile technique was used without complications. The patient tolerated the procedure well. Ice tonight to the injection site.\par \par \par \par Ultrasound Guidance was used for the following reasons: for Glenohumeral injection.\par \par \par \par Ultrasound guided injection was performed of the shoulder, visualization of the needle and placement of injection was performed without complication.\par

## 2022-07-18 NOTE — HISTORY OF PRESENT ILLNESS
[de-identified] : 70-year-old female who underwent reverse shoulder arthroplasty proximally 5 months ago. She had 2 previous rotator cuff repairs by Dr. Etienne which restored. Patient reports that prior to her reverse shoulder arthroplasty she had pain at rest and severe pain with trying to use the arm.\par Pain did not radiate into the hands and fingers. Patient had no diagnosis of herniated disc in the cervical region.\par Patient notes significant pain after the reverse shoulder arthroplasty which has been getting significantly worse.\par ESR and CRP were within normal limits. She denies fevers sweats chills erythema or wound dehiscence issues.\par Patient's  who is a personal friend brought her in for a second opinion regarding the etiology of her pain. Of note she did have a CT scan done which was read as posterior inferior glenoid fracture as well as lateral calcar humerus fracture.\par  The patient is Right handed.\par 7/28/20: patient returns today for follow up of ongoing left shoulder pain. Her pain is now constant and severe. She continues to report difficulty with activities of daily living. SHe presents to discuss surgery.\par 8/14/20: patient presents 10 days s/o left revision reverse shoulder arthroplasty with removal of previous rotator cuff anchor.\par \par 8/28/20: patient presents 4 weeks s/o left revision reverse shoulder arthroplasty with removal of previous rotator cuff anchor. The patient is doing well post operatively. She also presents with additional complaint of left knee pain. She has history of OA and reports recent exacerbation over the last 2-3 weeks. Pain is worse with walking long distances.\par 9/11/20: Patient returns today for follow up of left knee pain. Previous cortisone injection did not provide significant relief. She continues to report difficulty. She is interested in discussing further treatment options.\par patient is also recovering from left revision reverse shoulder arthroplasty. Some discomfort in the anterolateral aspect of the shoulder.\par 9/18/20: Patient returns today for follow up of left knee pain. She reports mild relief thus far from the first of her lubricating injections. She presents today for the 2nd of those injections.\par 9/25/20: Patient returns today for follow up of left knee pain. She reports mild relief thus far from her lubricating injections. She presents today for the 3rd of those injections.\par 10/5/20: Patient returns today for follow up regarding her neck and left shoulder pain. She denies any discrete trauma. She reports a significant atraumatic exacerbation of her pain after the weekend. She has been wearing a sling for immobilization of her LUE.\par \par 2/26/21: 6 months status post revision left reverse arthroplasty doing well. Also bilateral knee pain.\par She had an exacerbation of superior and lateral pain. She is exacerbation of neck pain and radicular symptoms.\par \par 5/24/21: Patient presents today for evaluation for recent left knee exacerbation. patient was last treated with orthovisc more than 6 months ago. She did report moderate improvement. Currently pain is constant. Pain is to medial aspect of her knee.\par \par 5/28/21: patient reports Orthovisc to her left knee last visit helped. She is here to discuss the possibility of a second injection.\par \par 6/4/21: patient presents to discuss her ongoing worsening left knee pain. She is here to discuss the possibility of proceeding with third Orthovisc injection to left knee.\par \par 8/9/21:patient reports that she was in the hospital with sepsis. Even prior to that however her shoulder has become increasingly more painful in the anterior aspect of the shoulder. She did have dramatic relief with prior epidural steroid injection.\par \par 8/16/21: patient reports a flareup of pain just lateral to the sternum on the left side I got worse with therapy. She is moving her left shoulder fluidly.\par \par 12/3/21: Patient returns with left knee pain. States a constant and severe pain. With respect to her neck and shoulder, she has been seeing Dr. Frankenberger for epidural injections and has no alleviation of pain.\par \par 12/10/21: Patient presents with ongoing left knee pain. Previous glenohumeral injection to her right knee provided moderate relief. She is here to discuss series of orthovisc injections .\par \par 12/17/21: patient presents with ongoing left knee pain. Previous orthovisc injection provided mild relief for the patient. She is here to discuss second orthovisc injections.\par \par 12/27/21: Patient presents with ongoing left knee pain. her previous orthovisc injection provided moderate relief for the patient. States no negative reactions. She is here to discuss third orthovisc injection.\par \par 4/8/22: Patient presents with new onset of left foot swelling x1 week. She denies any pain with walking or weight bearing. She was seen at [facility], by her PCP and Dr. Frankenberger who could not diagnose her left foot swelling. No swelling to her right foot. She soaked her foot in epsom salt and elevated it with no relief. \par \par 6/27/22: Patient presents for repeat evaluation of left knee pain. She experienced previous relief with previous series of orthovisc. Patient continues to have pain worsening over the last few days. She presents today to discuss first injection of orthovisc.\par 7/8/22: Patient presents for repeat evaluation of left knee pain. She admits mild relief from first injection of orthovisc. Denies any negative reactions. She presents to discuss second injection. \par 7/18/22: Patient presents for repeat evaluation of left knee pain. She admits moderate relief from second injection of orthovisc. Denies any negative reactions. She presents to discuss third injection.

## 2022-07-23 ENCOUNTER — APPOINTMENT (OUTPATIENT)
Dept: ORTHOPEDIC SURGERY | Facility: CLINIC | Age: 72
End: 2022-07-23

## 2022-07-27 ENCOUNTER — APPOINTMENT (OUTPATIENT)
Dept: ORTHOPEDIC SURGERY | Facility: CLINIC | Age: 72
End: 2022-07-27

## 2022-07-27 VITALS — BODY MASS INDEX: 31.8 KG/M2 | WEIGHT: 162 LBS | HEIGHT: 60 IN

## 2022-07-27 PROCEDURE — 99214 OFFICE O/P EST MOD 30 MIN: CPT

## 2022-08-01 NOTE — PHYSICAL EXAM
[] : lumbar paraspinal tenderness [de-identified] : Constitutional:\par - General Appearance:\par Unremarkable\par Body Habitus\par Well Developed\par Well Nourished\par Body Habitus\par No Deformities\par Well Groomed\par Ability To communicate:\par Normal\par Neurologic:\par Global sensation is intact to upper and lower extremities. See examination of Neck and/or Spine\par for exceptions.\par Orientation to Time, Place and Person is: Normal\par Mood And Affect is Normal\par Skin:\par - Head/Face, Right Upper/Lower Extremity, Left Upper/Lower Extremity: Normal\par See Examination of Neck and/or Spine for exceptions\par Cardiovascular:\par Peripheral Cardiovascular System is Normal\par Palpation of Lymph Nodes:\par Normal Palpation of lymph nodes in: Axilla, Cervical, Inguinal\par Abnormal Palpation of lymph nodes in: None

## 2022-08-01 NOTE — DISCUSSION/SUMMARY
[de-identified] : Discussed and reviewed results of lumbar MRI and cervical MRI. I am requesting an updated cervical spine MRI to evaluate for stenosis. Updated study needed as conservative treatment has not improved symptoms for 2 years. Will follow up after MRI , discussed possible ACDF surgery\par \par Prior to appointment and during encounter with patient extensive medical records were reviewed including but not limited to, hospital records, outpatient records, imaging results, and lab data.During this appointment the patient was examined, diagnoses were discussed and explained in a face to face manner. In addition extensive time was spent reviewing aforementioned diagnostic studies. Counseling including abnormal image results, differential diagnoses, treatment options, risk and benefits, lifestyle changes, current condition, and current medications was performed. Patient's comments, questions, and concerns were addressed and patient verbalized understanding. Based on this patient's presentation at our office, which is an orthopedic spine surgeon's office, this patient inherently / intrinsically has a risk, however minute, of developing issues such as Cauda equina syndrome, bowel and bladder changes, or progression of motor or neurological deficits such as paralysis which may be permanent.\par \par JESENIA BANKS Acting as a Scribe for Dr. Cardozo\kristin MCBRIDE, Jesenia Banks, attest that this documentation has been prepared under the direction and in the presence of Provider Madhav Cardozo MD.

## 2022-08-01 NOTE — DATA REVIEWED
[Lumbar Spine] : lumbar spine [MRI] : MRI [Outside X-rays] : outside x-rays [Cervical Spine] : cervical spine [Report was reviewed and noted in the chart] : The report was reviewed and noted in the chart [I independently reviewed and interpreted images and report] : I independently reviewed and interpreted images and report [I reviewed the films/CD and additionally noted] : I reviewed the films/CD and additionally noted [FreeTextEntry2] : (2020) significant stenosis C4-5  [FreeTextEntry3] : multilevel cervical spondylosisi [FreeTextEntry1] : I stop paperwork reviewed

## 2022-08-01 NOTE — HISTORY OF PRESENT ILLNESS
[Neck] : neck [8] : 8 [] : yes [de-identified] : 71 y/o female presents for an eval of back and neck pain. Reports difficulty with balance. Denies dizziness. Reports dexterity is normal. Patient has received ongoing conservative treatment for approx 2 years, with no improvement of symptoms. Patient is still attending PT, which she does not find helpful. Patient has not had recent imaging of cervical.\par \par Patient states she was not a candidate for ablation. Patient states no relief from TPI. \par \par \par  [FreeTextEntry7] : left shoulder  [de-identified] : MRI @  [de-identified] : Trigger point injection with Dr. Frankenberg

## 2022-08-09 ENCOUNTER — RESULT REVIEW (OUTPATIENT)
Age: 72
End: 2022-08-09

## 2022-08-17 ENCOUNTER — APPOINTMENT (OUTPATIENT)
Dept: ORTHOPEDIC SURGERY | Facility: CLINIC | Age: 72
End: 2022-08-17

## 2022-08-17 VITALS — HEIGHT: 60 IN | WEIGHT: 162 LBS | BODY MASS INDEX: 31.8 KG/M2

## 2022-08-17 PROCEDURE — 99214 OFFICE O/P EST MOD 30 MIN: CPT

## 2022-08-17 NOTE — HISTORY OF PRESENT ILLNESS
[Neck] : neck [] : yes [de-identified] : 71 y/o female presents for a test FU. Patient states LEFT sided shoulder, scapular, and b/l neck pain. Denies pain radiating into the BUE. She received MBB and TPI from pain management, but reports no relief. Reports significant difficulty with balance, she has perception of the floor while standing. Denies dizziness. Reports dexterity is normal. Patient is still attending PT. She has not received acupuncture treatment. \par \par \par  [de-identified] : Cspine MRI

## 2022-08-17 NOTE — DISCUSSION/SUMMARY
[Surgical risks reviewed] : Surgical risks reviewed [de-identified] : Discussed pathology of symptoms associated with spinal cord compression C4-5. Advised patient to continue to FU with neurologist. Discussed and reviewed results of cervical MRI. Neurology has found no other etiologies of her gait imbalance.  I discussed with the patient that since she has had persistent worsening problems with her balance, then she is a surgical candidate for cervical spinal fusion. Risks, benefits and expected outcomes have been explained to the patient. Patient was understanding and in agreement. Patient will consider surgery, and FU with surgical bradley if she decides to pursue surgery. She prefers [facility]. I referred patient to Dr. Mon for acupuncture. \par \par Prior to appointment and during encounter with patient extensive medical records were reviewed including but not limited to, hospital records, outpatient records, imaging results, and lab data.During this appointment the patient was examined, diagnoses were discussed and explained in a face to face manner. In addition extensive time was spent reviewing aforementioned diagnostic studies. Counseling including abnormal image results, differential diagnoses, treatment options, risk and benefits, lifestyle changes, current condition, and current medications was performed. Patient's comments, questions, and concerns were addressed and patient verbalized understanding. Based on this patient's presentation at our office, which is an orthopedic spine surgeon's office, this patient inherently / intrinsically has a risk, however minute, of developing issues such as Cauda equina syndrome, bowel and bladder changes, or progression of motor or neurological deficits such as paralysis which may be permanent.\par \par JESENIA BANKS Acting as a Scribe for Dr. Saucedo I, Jesenia Banks, attest that this documentation has been prepared under the direction and in the presence of Provider Madhav Cardozo MD.

## 2022-08-17 NOTE — PHYSICAL EXAM
[] : lumbar paraspinal tenderness [de-identified] : Constitutional:\par - General Appearance:\par Unremarkable\par Body Habitus\par Well Developed\par Well Nourished\par Body Habitus\par No Deformities\par Well Groomed\par Ability To communicate:\par Normal\par Neurologic:\par Global sensation is intact to upper and lower extremities. See examination of Neck and/or Spine\par for exceptions.\par Orientation to Time, Place and Person is: Normal\par Mood And Affect is Normal\par Skin:\par - Head/Face, Right Upper/Lower Extremity, Left Upper/Lower Extremity: Normal\par See Examination of Neck and/or Spine for exceptions\par Cardiovascular:\par Peripheral Cardiovascular System is Normal\par Palpation of Lymph Nodes:\par Normal Palpation of lymph nodes in: Axilla, Cervical, Inguinal\par Abnormal Palpation of lymph nodes in: None

## 2022-08-24 ENCOUNTER — APPOINTMENT (OUTPATIENT)
Dept: PHYSICAL MEDICINE AND REHAB | Facility: CLINIC | Age: 72
End: 2022-08-24

## 2022-08-24 VITALS
BODY MASS INDEX: 33.38 KG/M2 | RESPIRATION RATE: 80 BRPM | HEIGHT: 60 IN | SYSTOLIC BLOOD PRESSURE: 126 MMHG | DIASTOLIC BLOOD PRESSURE: 84 MMHG | WEIGHT: 170 LBS

## 2022-08-24 DIAGNOSIS — M51.26 OTHER INTERVERTEBRAL DISC DISPLACEMENT, LUMBAR REGION: ICD-10-CM

## 2022-08-24 PROCEDURE — 99205 OFFICE O/P NEW HI 60 MIN: CPT

## 2022-08-24 NOTE — HISTORY OF PRESENT ILLNESS
[FreeTextEntry1] : Patient is a 72-year-old female who presents for evaluation of chronic neck and low back pain.  Denies radicular symptoms involving upper or lower extremities.  Over the past 2 years she has been under the care of Dr. Frankenberger (pain management).  She reports undergoing cervical epidural steroid injections as well as trigger point junctions.  Both of only provided temporary relief of her symptoms.  She is currently receiving physical therapy to her cervical spine.  Her cervical spine pain is primarily left-sided.  She also reports balance issues and is being treated by Dr. Sethi for this complaint..  Patient's past medical history significant for a left shoulder replacement approximately 2 years ago.  She has recently been evaluated by Dr. Cardozo who obtained an MRI of her cervical spine and referred her to my office for a trial of acupuncture treatments to address her cervical and lumbar  spine complaints.

## 2022-08-24 NOTE — CONSULT LETTER
[Dear  ___] : Dear  [unfilled], [Consult Letter:] : I had the pleasure of evaluating your patient, [unfilled]. [Consult Closing:] : Thank you very much for allowing me to participate in the care of this patient.  If you have any questions, please do not hesitate to contact me. [Sincerely,] : Sincerely, [FreeTextEntry3] : Brian Malone MD

## 2022-08-24 NOTE — DATA REVIEWED
[FreeTextEntry1] : MRI cervical spine-8/9/2022\par MRI lumbar spine 7/1/2022\par MRI brain 6/22/2022

## 2022-08-24 NOTE — REVIEW OF SYSTEMS
[Depression] : depression [Negative] : Heme/Lymph [FreeTextEntry9] : See exam [de-identified] : See exam

## 2022-08-24 NOTE — ASSESSMENT
[FreeTextEntry1] : Initiate acupuncture treatment at a frequency of 1 time weekly x6 weeks.  The goal of which would be to decrease pain, dissipate muscle spasm, increased range of motion, and improve level of function.\par Home exercise program reviewed with patient\par Continue with pain management.  Follow-up with Dr. Cardozo

## 2022-08-24 NOTE — PHYSICAL EXAM
[FreeTextEntry1] : Patient is alert and cooperative and answers all questions appropriately.\par Examination of the cervical spine upper extremities:\par Cranial nerve testing intact, smell and taste not tested.  Visual fields were full.  No nystagmus noted.  Patient able to perform rapid alternating movements of the digits of the hands bilaterally.  Patient able to perform finger-nose response.  Mild resting tremor noted involving the bilateral upper extremities.  Romberg test negative\par Status post left shoulder replacement surgery.  Surgical site noted.  Atrophy involving the deltoid musculature on the left compared to the right.  On manual muscle testing there is weakness involving left shoulder flexion and abduction.  Otherwise grossly intact.  Reflexes 2 and symmetrical throughout upper extremities.  Sensory examination unremarkable, proprioceptive vibratory testing intact bilaterally.  Tinel's testing is negative at the wrist bilaterally Adson's maneuver negative bilaterally.  Spurling cervical compression test negative.  Good peripheral pulses.\par Range of motion analysis of the cervical spine was performed with use of goniometer:\par Flexion 40 degrees, extension 15 degrees, right rotation 55 degrees, left 50 degrees, right lateral bending 25 degrees, left lateral bending 20 degrees,\par Palpation-tenderness and spasm involving the bilateral cervical paraspinal and parascapular musculature with greater involvement on the left.  Trigger points bilateral trapezii with greater involvement on the left\par Examination of the lumbar spine and lower extremities:\par Reflexes 2 and symmetrical throughout.  No evidence of gross muscular atrophy appreciated.  Manual muscle testing of the lower extremities intact.  Sensory examination of lower extremities intact.  Proprioceptive vibratory testing intact bilaterally.  Good peripheral pulses.  Babinski response downgoing bilaterally.  Chaddock, Oppenheim, and Gonda test negative bilaterally.  SI joint challenge ligament test positive bilaterally.  Straight leg raising positive at 50 degrees on the right.\par Range of motion analysis was performed with use of goniometer:\par Flexion 50 degrees, extension 10 degrees, right lateral bending 25 degrees, left lateral bending 28 degrees, right rotation 30 degrees, left rotation 30 degrees,\par On palpation: Tenderness and spasm involving the bilateral lower lumbar paraspinal musculature.  Trigger points bilateral gluteal musculature.  Tenderness L4-L5 and L5-S1 interspace.  Tenderness bilateral sacroiliac joints\par

## 2022-08-30 ENCOUNTER — APPOINTMENT (OUTPATIENT)
Dept: PHYSICAL MEDICINE AND REHAB | Facility: CLINIC | Age: 72
End: 2022-08-30

## 2022-08-30 PROCEDURE — 97813 ACUP 1/> W/ESTIM 1ST 15 MIN: CPT

## 2022-08-30 PROCEDURE — 97814 ACUP 1/> W/ESTIM EA ADDL 15: CPT

## 2022-09-08 ENCOUNTER — APPOINTMENT (OUTPATIENT)
Dept: PHYSICAL MEDICINE AND REHAB | Facility: CLINIC | Age: 72
End: 2022-09-08

## 2022-09-08 DIAGNOSIS — M62.838 OTHER MUSCLE SPASM: ICD-10-CM

## 2022-09-08 DIAGNOSIS — M47.812 SPONDYLOSIS W/OUT MYELOPATHY OR RADICULOPATHY, CERVICAL REGION: ICD-10-CM

## 2022-09-08 DIAGNOSIS — M47.816 SPONDYLOSIS W/OUT MYELOPATHY OR RADICULOPATHY, LUMBAR REGION: ICD-10-CM

## 2022-09-08 DIAGNOSIS — M54.50 LOW BACK PAIN, UNSPECIFIED: ICD-10-CM

## 2022-09-08 DIAGNOSIS — M48.02 SPINAL STENOSIS, CERVICAL REGION: ICD-10-CM

## 2022-09-08 DIAGNOSIS — M43.16 SPONDYLOLISTHESIS, LUMBAR REGION: ICD-10-CM

## 2022-09-08 DIAGNOSIS — G89.29 LOW BACK PAIN, UNSPECIFIED: ICD-10-CM

## 2022-09-08 PROCEDURE — 97814 ACUP 1/> W/ESTIM EA ADDL 15: CPT

## 2022-09-08 PROCEDURE — 97813 ACUP 1/> W/ESTIM 1ST 15 MIN: CPT

## 2022-09-13 ENCOUNTER — RESULT REVIEW (OUTPATIENT)
Age: 72
End: 2022-09-13

## 2022-09-14 ENCOUNTER — APPOINTMENT (OUTPATIENT)
Dept: ORTHOPEDIC SURGERY | Facility: CLINIC | Age: 72
End: 2022-09-14

## 2022-09-14 VITALS — BODY MASS INDEX: 33.38 KG/M2 | HEIGHT: 60 IN | WEIGHT: 170 LBS

## 2022-09-14 DIAGNOSIS — M48.02 SPINAL STENOSIS, CERVICAL REGION: ICD-10-CM

## 2022-09-14 DIAGNOSIS — G99.2 SPINAL STENOSIS, CERVICAL REGION: ICD-10-CM

## 2022-09-14 PROCEDURE — 99213 OFFICE O/P EST LOW 20 MIN: CPT

## 2022-09-14 NOTE — HISTORY OF PRESENT ILLNESS
[de-identified] : Patient returns to the office for pre-operative visit. She is scheduled for ACDF C4/5 on 9/26/22 at [facility]. Since the last office visit Her symptoms have remained unchanged.  She c/o neck, shoulder and arm pains. \par Preoperative laboratory testing has been completed at [facility]. Medical clearance with PCP is scheduled for 9/20/22.\par \par

## 2022-09-14 NOTE — DISCUSSION/SUMMARY
[Surgical risks reviewed] : Surgical risks reviewed [de-identified] : Together in the office we reviewed the current diagnosis and its contributions to Her symptoms.  The planned surgical procedure was reviewed and explained to Her satisfaction including the risks and benefits.  This risk benefit conversation included, but was not limited to infection, bleeding, neurological injury, CSF leak, need for dural repair, hardware mal position, pseudoarthrosis, need for additional operation in the future, risks of general anesthesia. Expected outcomes included reduction in pain, improvement in function and expected recovery timeframe. All questions were answered to their satisfaction.\par \par Patient was reminded to bring their diagnostic imaging to the OR on the date of surgery.\par Provided she is able to obtain medical clearances will proceed with surgery as detailed above\par \par \par \par \par

## 2022-09-15 ENCOUNTER — APPOINTMENT (OUTPATIENT)
Dept: PHYSICAL MEDICINE AND REHAB | Facility: CLINIC | Age: 72
End: 2022-09-15

## 2022-09-19 ENCOUNTER — APPOINTMENT (OUTPATIENT)
Dept: ORTHOPEDIC SURGERY | Facility: CLINIC | Age: 72
End: 2022-09-19

## 2022-09-19 VITALS — WEIGHT: 170 LBS | BODY MASS INDEX: 33.38 KG/M2 | HEIGHT: 60 IN

## 2022-09-19 DIAGNOSIS — M25.562 PAIN IN LEFT KNEE: ICD-10-CM

## 2022-09-19 DIAGNOSIS — G89.29 PAIN IN LEFT KNEE: ICD-10-CM

## 2022-09-19 PROCEDURE — 20611 DRAIN/INJ JOINT/BURSA W/US: CPT | Mod: LT

## 2022-09-19 PROCEDURE — 99214 OFFICE O/P EST MOD 30 MIN: CPT | Mod: 25

## 2022-09-19 NOTE — PHYSICAL EXAM
[de-identified] : Constitutional: The general appearance of the patient is well developed, well nourished, no deformities and well groomed. Normal \par \par Gait: Gait and function is as follows: normal gait. \par \par Skin: Head and neck visualized skin is normal. Left upper extremity visualized skin is normal. Right upper extremity visualized skin is normal. Thoracic Skin of the thoracic spine shows visualized skin is normal. \par \par Cardiovascular: palpable radial pulse bilaterally, good capillary refill in digits of the bilateral upper extremities and no temperature or color changes in the bilateral upper extremities. \par \par Lymphatic: Normal Palpation of lymph nodes in the cervical. \par \par Neurologic: fine motor control in the bilateral upper extremities is intact. Deep Tendon Reflexes in Upper and Lower Extremities Negative Mathew's in the bilateral upper extremities. The patient is oriented to time, place and person. Sensation to light touch intact in the bilateral upper extremities. Mood and Affect is normal. \par \par Left Knee: Inspection of the knee is as follows: mild effusion. no ecchymosis and no streaking. Palpation of the knee is as follows: medial joint line tenderness and patella tendon tenderness. Knee Range of Motion is as follows: Range of motion is limited secondary to pain. Strength examination of the knee is as follows: Quadriceps strength is 5/5 Hamstring strength is 5/5 Ligament Stability and Special Test ligamentously stable. positive mcmurrays. Neurological examination of the knee is as follows: light touch is intact throughout. Gait and function is as follows: normal gait\par Right Shoulder:  Inspection of the shoulder/upper arm is as follows: There is a full, pain-free, stable range of motion of the shoulder with normal strength and no tenderness to palpation. \par \par Left Shoulder: Inspection of the shoulder/upper arm is as follows: no scapula winging, no biceps deformity and no AC joint deformity. Palpation of the shoulder/upper arm is as follows: There is tenderness at the proximal biceps tendon. Range of motion of the shoulder is as follows: Pain with internal rotation, external rotation, abduction and forward flexion. Strength of the shoulder is as follows: Supraspinatus 4/5. External Rotation 4/5. Internal Rotation 4/5. Deltoid 5/5 Ligament Stability and Special Tests of the shoulder is as follows: Neer test is positive. Anderson' test is positive. Speed's test is positive. \par \par Neck: \par Inspection / Palpation of the cervical spine is as follows: There is a full, pain free, stable range of motion of the cervical spine with normal tone and no tenderness to palpation. \par \par Back, including spine: Inspection / Palpation of the thoracic/lumbar spine is as follows: There is a full, pain free, stable range of motion of the thoracic spine with a normal tone and not tenderness to palpation..

## 2022-09-19 NOTE — PROCEDURE
[Large Joint Injection] : Large joint injection [Left] : of the left [Knee] : knee [Ethyl Chloride sprayed topically] : ethyl chloride sprayed topically [Sterile technique used] : sterile technique used [___ cc    1%] : Lidocaine ~Vcc of 1%  [___ cc    10mg] : Triamcinolone (Kenalog) ~Vcc of 10 mg  [All ultrasound images have been permanently captured and stored accordingly in our picture archiving and communication system] : All ultrasound images have been permanently captured and stored accordingly in our picture archiving and communication system [Visualization of the needle and placement of injection was performed without complication] : visualization of the needle and placement of injection was performed without complication [Pain] : pain [Inflammation] : inflammation [Orthovisc] : Orthovisc [#3] : series #3 [FreeTextEntry3] : Kenalog Injection: X-ray evidence of Osteoarthritis on this or prior visit, Patient has tried OTC's including aspirin, Ibuprofen, Aleve etc or prescription NSAIDS, and/or exercises at home and/ or physical therapy without satisfactory response and Repeat series performed because patient had significant improvement in their pain and functional capacity from prior series which was given more than six months ago.\par An injection of  40mg kenalog and 3 cc lidocaine was injected after verbal consent using sterile technique. The risks, benefits, and alternatives to steroid injection were explained in full to the patient. Risks outlined include but are not limited to infection, sepsis, bleeding, scarring, skin discoloration, temporary increase in pain, syncopal episode, failure to resolve symptoms, allergic reaction, and symptom recurrence. Signs and symptoms of infection reviewed and patient advised to call immediately for redness, fevers, and/or chills. Patient understood the risks. All questions were answered. After discussion of options, patient requested Viscosupplementation. Oral informed consent was obtained and sterile prep was done of the injection site. Sterile technique was used without complications. The patient tolerated the procedure well. Ice tonight to the injection site.\par \par \par \par Ultrasound Guidance was used for the following reasons: for Glenohumeral injection.\par \par \par \par Ultrasound guided injection was performed of the shoulder, visualization of the needle and placement of injection was performed without complication.\par

## 2022-09-19 NOTE — DISCUSSION/SUMMARY
[Medication Risks Reviewed] : Medication risks reviewed [de-identified] : 71-year-old female who is now more than one year removed from left reverse shoulder arthroplasty was done as a revision.\par Patient had some pain after surgery more than I would expect and a diffuse degenerative cervical issue was identified.\par Patient has been seeing Dr. Frankenberger for epidural injections and has had no alleviation of pain\par She will continue to follow up with Dr. Frankenberger\par Patient will follow up as needed for her shoulder.\par \par With respect to her left knee, patient c/o of a constant and severe pain\par Patient has experienced relief with previous series of orthovisc injection and cortisone injections\par After a long discussion was had with the patient outlining risks/benefits we decided to proceed with a series of orthovisc injections. \par States second injection provided mild relief. Denies any negative reactions. \par Patient was treated today with injection #3. Tolerated the injection well. \par Patient tolerated the injection well. \par Discussed with patient to contact the office or present to ER should they develop any swelling, fever or drainage.\par Patient will follow up as needed \par \par \par (1) We discussed a comprehensive treatment plans that included a prescription management plan involving the use of prescription strength medications to include Ibuprofen 600-800 mg TID, versus 500-650 mg Tylenol. We also discussed prescribing topical diclofenac (Voltaren gel) as well as once daily Meloxicam 15 mg.\par (2) The patient has More Than One chronic injuries/illnesses as outlined, discussed, and documented by ICD 10 codes listed, as well as the HPI and Plan section.\par There is a moderate risk of morbidity with further treatment, especially from use of prescription strength medications and possible side effects of these medications which include upset stomach and cardiac/renal issues with long term use were discussed.\par (3) I recommended that the patient follow-up with their medical physician to discuss any significant specific potential issues with long term use such as interactions with current medications or with exacerbation of underlying medical morbidities. \par \par Attestation:\par I, Dana Jessica , attest that this documentation has been prepared under the direction and in the presence of Provider Huber Bain MD.\par The documentation recorded by the scribe, in my presence, accurately reflects the service I personally performed, and the decisions made by me with my edits as appropriate.\par Huber Bain MD\par \par \par

## 2022-09-22 ENCOUNTER — APPOINTMENT (OUTPATIENT)
Dept: PHYSICAL MEDICINE AND REHAB | Facility: CLINIC | Age: 72
End: 2022-09-22

## 2022-09-26 ENCOUNTER — APPOINTMENT (OUTPATIENT)
Dept: ORTHOPEDIC SURGERY | Facility: HOSPITAL | Age: 72
End: 2022-09-26

## 2022-09-26 PROCEDURE — 22551 ARTHRD ANT NTRBDY CERVICAL: CPT | Mod: AS

## 2022-09-26 PROCEDURE — 22853 INSJ BIOMECHANICAL DEVICE: CPT

## 2022-09-26 PROCEDURE — 22853 INSJ BIOMECHANICAL DEVICE: CPT | Mod: AS

## 2022-09-26 PROCEDURE — 22551 ARTHRD ANT NTRBDY CERVICAL: CPT

## 2022-10-07 ENCOUNTER — APPOINTMENT (OUTPATIENT)
Dept: PHYSICAL MEDICINE AND REHAB | Facility: CLINIC | Age: 72
End: 2022-10-07

## 2022-10-07 ENCOUNTER — APPOINTMENT (OUTPATIENT)
Dept: ORTHOPEDIC SURGERY | Facility: CLINIC | Age: 72
End: 2022-10-07

## 2022-10-07 VITALS — HEIGHT: 60 IN | BODY MASS INDEX: 33.38 KG/M2 | WEIGHT: 170 LBS

## 2022-10-07 PROCEDURE — 72040 X-RAY EXAM NECK SPINE 2-3 VW: CPT

## 2022-10-07 PROCEDURE — 99024 POSTOP FOLLOW-UP VISIT: CPT

## 2022-10-09 NOTE — DISCUSSION/SUMMARY
[de-identified] : Patient will continue with postoperative restrictions. Discussed proper wound care and management. Explained expected outcomes post op including reduction in pain, improvement in function and expected recovery timeframe. All questions were answered to their satisfaction. Patient advised to walk short distances more frequently. I would like to follow up with the patient in 2-3 weeks. \par \par JESENIA BANKS Acting as a Scribe for Dr. Cardozo\kristin I, Jesenia Banks, attest that this documentation has been prepared under the direction and in the presence of Provider Madhav Cardozo MD.

## 2022-10-09 NOTE — HISTORY OF PRESENT ILLNESS
[de-identified] : 73 y/o female presents for a 1st po. Difficulty with balance still present. Mild difficulty swallowing. She has been walking as best tolerated. Patient followed up with urologist, micturition returned to normal.

## 2022-10-09 NOTE — PHYSICAL EXAM
[Implants in position] : Implants in position [No loosening of hardware] : No loosening of hardware [de-identified] : Constitutional:\par - General Appearance:\par Unremarkable\par Body Habitus\par Well Developed\par Well Nourished\par Body Habitus\par No Deformities\par Well Groomed\par Ability To communicate:\par Normal\par Neurologic:\par Global sensation is intact to upper and lower extremities. See examination of Neck and/or Spine\par for exceptions.\par Orientation to Time, Place and Person is: Normal\par Mood And Affect is Normal\par Skin:\par - Head/Face, Right Upper/Lower Extremity, Left Upper/Lower Extremity: Normal\par See Examination of Neck and/or Spine for exceptions\par Cardiovascular:\par Peripheral Cardiovascular System is Normal\par Palpation of Lymph Nodes:\par Normal Palpation of lymph nodes in: Axilla, Cervical, Inguinal\par Abnormal Palpation of lymph nodes in: None  [] : no sign of infection [FreeTextEntry1] : Good progress toward fusion.

## 2022-10-14 ENCOUNTER — APPOINTMENT (OUTPATIENT)
Dept: PHYSICAL MEDICINE AND REHAB | Facility: CLINIC | Age: 72
End: 2022-10-14

## 2022-10-17 RX ORDER — METHOCARBAMOL 750 MG/1
750 TABLET, FILM COATED ORAL
Qty: 30 | Refills: 1 | Status: ACTIVE | COMMUNITY
Start: 2022-10-17 | End: 1900-01-01

## 2022-10-21 ENCOUNTER — APPOINTMENT (OUTPATIENT)
Dept: PHYSICAL MEDICINE AND REHAB | Facility: CLINIC | Age: 72
End: 2022-10-21

## 2022-10-28 ENCOUNTER — APPOINTMENT (OUTPATIENT)
Dept: PHYSICAL MEDICINE AND REHAB | Facility: CLINIC | Age: 72
End: 2022-10-28

## 2022-11-04 ENCOUNTER — APPOINTMENT (OUTPATIENT)
Dept: PHYSICAL MEDICINE AND REHAB | Facility: CLINIC | Age: 72
End: 2022-11-04

## 2022-11-04 ENCOUNTER — APPOINTMENT (OUTPATIENT)
Dept: ORTHOPEDIC SURGERY | Facility: CLINIC | Age: 72
End: 2022-11-04

## 2022-11-23 ENCOUNTER — APPOINTMENT (OUTPATIENT)
Dept: ORTHOPEDIC SURGERY | Facility: CLINIC | Age: 72
End: 2022-11-23

## 2022-12-02 ENCOUNTER — APPOINTMENT (OUTPATIENT)
Dept: ORTHOPEDIC SURGERY | Facility: CLINIC | Age: 72
End: 2022-12-02

## 2022-12-02 PROCEDURE — 72040 X-RAY EXAM NECK SPINE 2-3 VW: CPT

## 2022-12-02 PROCEDURE — 99024 POSTOP FOLLOW-UP VISIT: CPT

## 2022-12-04 NOTE — DISCUSSION/SUMMARY
[de-identified] : Again, I explained expected outcomes post op including reduction in pain, improvement in function and expected recovery timeframe. All questions were answered to their satisfaction. Patient advised to walk short distances more frequently. I would like to follow up with the patient in 2-3 weeks. \par \par JESENIA BANKS Acting as a Scribe for Dr. Cardozo\par I, Jesenia Banks, attest that this documentation has been prepared under the direction and in the presence of Provider Madhav Cardozo MD.

## 2022-12-04 NOTE — HISTORY OF PRESENT ILLNESS
[de-identified] : 12/02/2022 - 73 y/o female presenting for a 2nd post op. Chief complaint is left shoulder pain. She states Improvements in balance and dexterity since the surgery. Patient has self discontinued medications for pain. \par \par The patient is s/p ACDF C4-C5\par Date of Surgery: 09/26/2022\par Pain:    At Rest: 0/10 \par With Activity:  0/10 \par Treatment/Imaging/Studies Since Last Visit: \par 	\par -------------------------------------------------\par 10/02/2022- 73 y/o female presents for a 1st po. Difficulty with balance still present. Mild difficulty swallowing. She has been walking as best tolerated. Patient followed up with urologist, micturition returned to normal.

## 2022-12-04 NOTE — DATA REVIEWED
[Cervical Spine] : cervical spine [I independently reviewed and interpreted images and report] : I independently reviewed and interpreted images and report [I reviewed the films/CD and additionally noted] : I reviewed the films/CD and additionally noted [FreeTextEntry1] : In office xrays taken today demonstrate good maintenance of alignment and implant placement, progress to fusion.

## 2022-12-04 NOTE — PHYSICAL EXAM
[Left] : left shoulder [Normal Coordination] : normal coordination [Normal DTR UE/LE] : normal DTR UE/LE  [Normal Sensation] : normal sensation [Normal Mood and Affect] : normal mood and affect [Orientated] : orientated [Able to Communicate] : able to communicate [Normal Skin] : normal skin [No Rash] : no rash [No Ulcers] : no ulcers [No Lesions] : no lesions [No obvious lymphadenopathy in areas examined] : no obvious lymphadenopathy in areas examined [Well Developed] : well developed [Peripheral vascular exam is grossly normal] : peripheral vascular exam is grossly normal [No Respiratory Distress] : no respiratory distress [] : no sign of infection [FreeTextEntry8] : left shoulder pain

## 2022-12-05 ENCOUNTER — APPOINTMENT (OUTPATIENT)
Dept: ORTHOPEDIC SURGERY | Facility: CLINIC | Age: 72
End: 2022-12-05
Payer: MEDICARE

## 2022-12-05 VITALS — BODY MASS INDEX: 33.38 KG/M2 | HEIGHT: 60 IN | WEIGHT: 170 LBS

## 2022-12-05 DIAGNOSIS — M17.12 UNILATERAL PRIMARY OSTEOARTHRITIS, LEFT KNEE: ICD-10-CM

## 2022-12-05 DIAGNOSIS — M25.562 PAIN IN LEFT KNEE: ICD-10-CM

## 2022-12-05 PROCEDURE — 20611 DRAIN/INJ JOINT/BURSA W/US: CPT | Mod: 79,LT

## 2022-12-05 PROCEDURE — 99214 OFFICE O/P EST MOD 30 MIN: CPT | Mod: 25

## 2022-12-05 RX ORDER — CYCLOBENZAPRINE HYDROCHLORIDE 10 MG/1
10 TABLET, FILM COATED ORAL
Qty: 20 | Refills: 0 | Status: ACTIVE | COMMUNITY
Start: 2022-12-05 | End: 1900-01-01

## 2022-12-05 NOTE — HISTORY OF PRESENT ILLNESS
[de-identified] : 70-year-old female who underwent reverse shoulder arthroplasty proximally 5 months ago. She had 2 previous rotator cuff repairs by Dr. Etienne which restored. Patient reports that prior to her reverse shoulder arthroplasty she had pain at rest and severe pain with trying to use the arm.\par Pain did not radiate into the hands and fingers. Patient had no diagnosis of herniated disc in the cervical region.\par Patient notes significant pain after the reverse shoulder arthroplasty which has been getting significantly worse.\par ESR and CRP were within normal limits. She denies fevers sweats chills erythema or wound dehiscence issues.\par Patient's  who is a personal friend brought her in for a second opinion regarding the etiology of her pain. Of note she did have a CT scan done which was read as posterior inferior glenoid fracture as well as lateral calcar humerus fracture.\par  The patient is Right handed.\par 7/28/20: patient returns today for follow up of ongoing left shoulder pain. Her pain is now constant and severe. She continues to report difficulty with activities of daily living. SHe presents to discuss surgery.\par 8/14/20: patient presents 10 days s/o left revision reverse shoulder arthroplasty with removal of previous rotator cuff anchor.\par \par 8/28/20: patient presents 4 weeks s/o left revision reverse shoulder arthroplasty with removal of previous rotator cuff anchor. The patient is doing well post operatively. She also presents with additional complaint of left knee pain. She has history of OA and reports recent exacerbation over the last 2-3 weeks. Pain is worse with walking long distances.\par 9/11/20: Patient returns today for follow up of left knee pain. Previous cortisone injection did not provide significant relief. She continues to report difficulty. She is interested in discussing further treatment options.\par patient is also recovering from left revision reverse shoulder arthroplasty. Some discomfort in the anterolateral aspect of the shoulder.\par 9/18/20: Patient returns today for follow up of left knee pain. She reports mild relief thus far from the first of her lubricating injections. She presents today for the 2nd of those injections.\par 9/25/20: Patient returns today for follow up of left knee pain. She reports mild relief thus far from her lubricating injections. She presents today for the 3rd of those injections.\par 10/5/20: Patient returns today for follow up regarding her neck and left shoulder pain. She denies any discrete trauma. She reports a significant atraumatic exacerbation of her pain after the weekend. She has been wearing a sling for immobilization of her LUE.\par \par 2/26/21: 6 months status post revision left reverse arthroplasty doing well. Also bilateral knee pain.\par She had an exacerbation of superior and lateral pain. She is exacerbation of neck pain and radicular symptoms.\par \par 5/24/21: Patient presents today for evaluation for recent left knee exacerbation. patient was last treated with orthovisc more than 6 months ago. She did report moderate improvement. Currently pain is constant. Pain is to medial aspect of her knee.\par \par 5/28/21: patient reports Orthovisc to her left knee last visit helped. She is here to discuss the possibility of a second injection.\par \par 6/4/21: patient presents to discuss her ongoing worsening left knee pain. She is here to discuss the possibility of proceeding with third Orthovisc injection to left knee.\par \par 8/9/21:patient reports that she was in the hospital with sepsis. Even prior to that however her shoulder has become increasingly more painful in the anterior aspect of the shoulder. She did have dramatic relief with prior epidural steroid injection.\par \par 8/16/21: patient reports a flareup of pain just lateral to the sternum on the left side I got worse with therapy. She is moving her left shoulder fluidly.\par \par 12/3/21: Patient returns with left knee pain. States a constant and severe pain. With respect to her neck and shoulder, she has been seeing Dr. Frankenberger for epidural injections and has no alleviation of pain.\par \par 12/10/21: Patient presents with ongoing left knee pain. Previous glenohumeral injection to her right knee provided moderate relief. She is here to discuss series of orthovisc injections .\par \par 12/17/21: patient presents with ongoing left knee pain. Previous orthovisc injection provided mild relief for the patient. She is here to discuss second orthovisc injections.\par \par 12/27/21: Patient presents with ongoing left knee pain. her previous orthovisc injection provided moderate relief for the patient. States no negative reactions. She is here to discuss third orthovisc injection.\par \par 4/8/22: Patient presents with new onset of left foot swelling x1 week. She denies any pain with walking or weight bearing. She was seen at [facility], by her PCP and Dr. Frankenberger who could not diagnose her left foot swelling. No swelling to her right foot. She soaked her foot in epsom salt and elevated it with no relief. \par \par 6/27/22: Patient presents for repeat evaluation of left knee pain. She experienced previous relief with previous series of orthovisc. Patient continues to have pain worsening over the last few days. She presents today to discuss first injection of orthovisc.\par 7/8/22: Patient presents for repeat evaluation of left knee pain. She admits mild relief from first injection of orthovisc. Denies any negative reactions. She presents to discuss second injection. \par 7/18/22: Patient presents for repeat evaluation of left knee pain. She admits moderate relief from second injection of orthovisc. Denies any negative reactions. She presents to discuss third injection. \par \par 9/19/22: Patient presents for treatment of left knee pain.  She is having surgery in 1 week on her neck and is having difficulty ambulating.\par 12/5/22: Patient presents for repeat evaluation of left knee pain. She states overall relief from previous series of orthovisc injections. She presents to discuss cortisone injection.

## 2022-12-05 NOTE — PROCEDURE
[Large Joint Injection] : Large joint injection [Left] : of the left [Knee] : knee [Ethyl Chloride sprayed topically] : ethyl chloride sprayed topically [Sterile technique used] : sterile technique used [___ cc    1%] : Lidocaine ~Vcc of 1%  [___ cc    10mg] : Triamcinolone (Kenalog) ~Vcc of 10 mg  [#3] : series #3 [All ultrasound images have been permanently captured and stored accordingly in our picture archiving and communication system] : All ultrasound images have been permanently captured and stored accordingly in our picture archiving and communication system [Visualization of the needle and placement of injection was performed without complication] : visualization of the needle and placement of injection was performed without complication [Pain] : pain [Inflammation] : inflammation [FreeTextEntry3] : Kenalog Injection: X-ray evidence of Osteoarthritis on this or prior visit, Patient has tried OTC's including aspirin, Ibuprofen, Aleve etc or prescription NSAIDS, and/or exercises at home and/ or physical therapy without satisfactory response and Repeat series performed because patient had significant improvement in their pain and functional capacity from prior series which was given more than six months ago.\par An injection of  40mg kenalog and 3 cc lidocaine was injected after verbal consent using sterile technique. The risks, benefits, and alternatives to steroid injection were explained in full to the patient. Risks outlined include but are not limited to infection, sepsis, bleeding, scarring, skin discoloration, temporary increase in pain, syncopal episode, failure to resolve symptoms, allergic reaction, and symptom recurrence. Signs and symptoms of infection reviewed and patient advised to call immediately for redness, fevers, and/or chills. Patient understood the risks. All questions were answered. After discussion of options, patient requested Viscosupplementation. Oral informed consent was obtained and sterile prep was done of the injection site. Sterile technique was used without complications. The patient tolerated the procedure well. Ice tonight to the injection site.\par \par \par \par Ultrasound Guidance was used for the following reasons: for Glenohumeral injection.\par \par \par \par Ultrasound guided injection was performed of the shoulder, visualization of the needle and placement of injection was performed without complication.\par

## 2022-12-05 NOTE — PHYSICAL EXAM
[de-identified] : Constitutional: The general appearance of the patient is well developed, well nourished, no deformities and well groomed. Normal \par \par Gait: Gait and function is as follows: normal gait. \par \par Skin: Head and neck visualized skin is normal. Left upper extremity visualized skin is normal. Right upper extremity visualized skin is normal. Thoracic Skin of the thoracic spine shows visualized skin is normal. \par \par Cardiovascular: palpable radial pulse bilaterally, good capillary refill in digits of the bilateral upper extremities and no temperature or color changes in the bilateral upper extremities. \par \par Lymphatic: Normal Palpation of lymph nodes in the cervical. \par \par Neurologic: fine motor control in the bilateral upper extremities is intact. Deep Tendon Reflexes in Upper and Lower Extremities Negative Mathew's in the bilateral upper extremities. The patient is oriented to time, place and person. Sensation to light touch intact in the bilateral upper extremities. Mood and Affect is normal. \par \par Left Knee: Inspection of the knee is as follows: mild effusion. no ecchymosis and no streaking. Palpation of the knee is as follows: medial joint line tenderness and patella tendon tenderness. Knee Range of Motion is as follows: Range of motion is limited secondary to pain. Strength examination of the knee is as follows: Quadriceps strength is 5/5 Hamstring strength is 5/5 Ligament Stability and Special Test ligamentously stable. positive mcmurrays. Neurological examination of the knee is as follows: light touch is intact throughout. Gait and function is as follows: normal gait\par Right Shoulder:  Inspection of the shoulder/upper arm is as follows: There is a full, pain-free, stable range of motion of the shoulder with normal strength and no tenderness to palpation. \par \par Left Shoulder: Inspection of the shoulder/upper arm is as follows: no scapula winging, no biceps deformity and no AC joint deformity. Palpation of the shoulder/upper arm is as follows: There is tenderness at the proximal biceps tendon. Range of motion of the shoulder is as follows: Pain with internal rotation, external rotation, abduction and forward flexion. Strength of the shoulder is as follows: Supraspinatus 4/5. External Rotation 4/5. Internal Rotation 4/5. Deltoid 5/5 Ligament Stability and Special Tests of the shoulder is as follows: Neer test is positive. Anderson' test is positive. Speed's test is positive. \par \par Neck: \par Inspection / Palpation of the cervical spine is as follows: There is a full, pain free, stable range of motion of the cervical spine with normal tone and no tenderness to palpation. \par \par Back, including spine: Inspection / Palpation of the thoracic/lumbar spine is as follows: There is a full, pain free, stable range of motion of the thoracic spine with a normal tone and not tenderness to palpation..

## 2022-12-05 NOTE — DISCUSSION/SUMMARY
[Medication Risks Reviewed] : Medication risks reviewed [de-identified] : 72-year-old female who is now more than one year removed from left reverse shoulder arthroplasty was done as a revision.\par Patient had some pain after surgery more than I would expect and a diffuse degenerative cervical issue was identified.\par Patient has been seeing Dr. Frankenberger for epidural injections and has had no alleviation of pain\par She will continue to follow up with Dr. Frankenberger\par Patient will follow up as needed for her shoulder.\par \par With respect to her left knee, patient c/o of a constant and severe pain\par Patient has experienced relief with previous series of orthovisc injection and cortisone injections - most recent being Sept. 19\par She was treated today with US guided knee injection to aid in decrease of pain and inflammation \par Patient will follow up as needed \par \par \par (1) We discussed a comprehensive treatment plans that included a prescription management plan involving the use of prescription strength medications to include Ibuprofen 600-800 mg TID, versus 500-650 mg Tylenol. We also discussed prescribing topical diclofenac (Voltaren gel) as well as once daily Meloxicam 15 mg.\par (2) The patient has More Than One chronic injuries/illnesses as outlined, discussed, and documented by ICD 10 codes listed, as well as the HPI and Plan section.\par There is a moderate risk of morbidity with further treatment, especially from use of prescription strength medications and possible side effects of these medications which include upset stomach and cardiac/renal issues with long term use were discussed.\par (3) I recommended that the patient follow-up with their medical physician to discuss any significant specific potential issues with long term use such as interactions with current medications or with exacerbation of underlying medical morbidities. \par \par Attestation:\par I, Dana Jessica , attest that this documentation has been prepared under the direction and in the presence of Provider Huber Bain MD.\par The documentation recorded by the scribe, in my presence, accurately reflects the service I personally performed, and the decisions made by me with my edits as appropriate.\par Huber Bain MD\par \par \par

## 2023-01-02 ENCOUNTER — FORM ENCOUNTER (OUTPATIENT)
Age: 73
End: 2023-01-02

## 2023-01-18 ENCOUNTER — APPOINTMENT (OUTPATIENT)
Dept: ORTHOPEDIC SURGERY | Facility: CLINIC | Age: 73
End: 2023-01-18
Payer: MEDICARE

## 2023-01-18 VITALS — HEIGHT: 60 IN | WEIGHT: 170 LBS | BODY MASS INDEX: 33.38 KG/M2

## 2023-01-18 DIAGNOSIS — G95.9 DISEASE OF SPINAL CORD, UNSPECIFIED: ICD-10-CM

## 2023-01-18 DIAGNOSIS — M46.00 SPINAL ENTHESOPATHY, SITE UNSPECIFIED: ICD-10-CM

## 2023-01-18 PROCEDURE — 99213 OFFICE O/P EST LOW 20 MIN: CPT | Mod: 25

## 2023-01-18 PROCEDURE — 20552 NJX 1/MLT TRIGGER POINT 1/2: CPT

## 2023-01-18 PROCEDURE — 72040 X-RAY EXAM NECK SPINE 2-3 VW: CPT

## 2023-01-19 PROBLEM — M46.00 SPINAL ENTHESOPATHY: Status: ACTIVE | Noted: 2023-01-19

## 2023-01-19 NOTE — PHYSICAL EXAM
[Normal Coordination] : normal coordination [Normal DTR UE/LE] : normal DTR UE/LE  [Normal Sensation] : normal sensation [Normal Mood and Affect] : normal mood and affect [Orientated] : orientated [Able to Communicate] : able to communicate [Normal Skin] : normal skin [No Rash] : no rash [No Ulcers] : no ulcers [No Lesions] : no lesions [No obvious lymphadenopathy in areas examined] : no obvious lymphadenopathy in areas examined [Well Developed] : well developed [Peripheral vascular exam is grossly normal] : peripheral vascular exam is grossly normal [No Respiratory Distress] : no respiratory distress [Left] : left shoulder [Implants in position] : Implants in position [No loosening of hardware] : No loosening of hardware [] : negative Mathew reflex [FreeTextEntry8] : left shoulder pain [FreeTextEntry9] : pain with ROM

## 2023-01-19 NOTE — DISCUSSION/SUMMARY
[de-identified] : Again, I explained expected outcomes post op including reduction in pain, improvement in function and expected recovery timeframe. All questions were answered to their satisfaction. Patient advised to walk short distances more. Discussed pathology of her symptoms associated with muscular pain. Today in office as part of ongoing treatment a trigger point injection is administered into left trapezius and paraccervical mujscles  to facilitate ROM and stretching. Injection consisted of 1cc Kenalog 40 and 4cc .25% Marcaine. F/U in 5-6 weeks. \par \par Prior to appointment and during encounter with patient extensive medical records were reviewed including but not limited to, hospital records, outpatient records, imaging results, and lab data.During this appointment the patient was examined, diagnoses were discussed and explained in a face to face manner. In addition extensive time was spent reviewing aforementioned diagnostic studies. Counseling including abnormal image results, differential diagnoses, treatment options, risk and benefits, lifestyle changes, current condition, and current medications was performed. Patient's comments, questions, and concerns were addressed and patient verbalized understanding. Based on this patient's presentation at our office, which is an orthopedic spine surgeon's office, this patient inherently / intrinsically has a risk, however minute, of developing issues such as Cauda equina syndrome, bowel and bladder changes, or progression of motor or neurological deficits such as paralysis which may be permanent. \par \par JESENIA BANKS Acting as a Scribe for Dr. Saucedo I, Jesenia Banks, attest that this documentation has been prepared under the direction and in the presence of Provider Madhav Cardozo MD.

## 2023-01-19 NOTE — HISTORY OF PRESENT ILLNESS
[de-identified] : 01/18/2023 - The patient is s/p ACDF C4-C5. Her chief complaint is focal left sided neck pain, shoulder blade pain, and shoulder pain. \par She states she was doing well up until 2 weeks ago. There are no radicular symptoms extending throughout the upper extremities. She states these symptoms are all new, not the same as her preoperative symptoms. C/o instability, she is following up with neurologist for this issue. Dexterity is normal. \par \par Date of Surgery: 09/26/22\par Pain:    At Rest: 6/10 \par With Activity:  8/10 \par \par 12/02/2022 - 71 y/o female presenting for a 2nd post op. Chief complaint is left shoulder pain. She states Improvements in balance and dexterity since the surgery. Patient has self discontinued medications for pain. \par \par The patient is s/p ACDF C4-C5\par Date of Surgery: 09/26/2022\par Pain:    At Rest: 0/10 \par With Activity:  0/10 \par Treatment/Imaging/Studies Since Last Visit: \par 	\par -------------------------------------------------\par 10/02/2022- 71 y/o female presents for a 1st po. Difficulty with balance still present. Mild difficulty swallowing. She has been walking as best tolerated. Patient followed up with urologist, micturition returned to normal.

## 2023-02-10 ENCOUNTER — APPOINTMENT (OUTPATIENT)
Dept: ORTHOPEDIC SURGERY | Facility: CLINIC | Age: 73
End: 2023-02-10
Payer: MEDICARE

## 2023-02-10 DIAGNOSIS — M54.2 CERVICALGIA: ICD-10-CM

## 2023-02-10 PROCEDURE — 99214 OFFICE O/P EST MOD 30 MIN: CPT

## 2023-02-10 PROCEDURE — 73030 X-RAY EXAM OF SHOULDER: CPT | Mod: LT

## 2023-02-10 RX ORDER — METHYLPREDNISOLONE 4 MG/1
4 TABLET ORAL
Qty: 1 | Refills: 0 | Status: ACTIVE | COMMUNITY
Start: 2023-02-10 | End: 1900-01-01

## 2023-02-10 RX ORDER — GABAPENTIN 100 MG/1
100 CAPSULE ORAL 3 TIMES DAILY
Qty: 90 | Refills: 0 | Status: ACTIVE | COMMUNITY
Start: 2023-02-10 | End: 1900-01-01

## 2023-02-10 RX ORDER — CYCLOBENZAPRINE HYDROCHLORIDE 10 MG/1
10 TABLET, FILM COATED ORAL
Qty: 20 | Refills: 0 | Status: ACTIVE | COMMUNITY
Start: 2023-02-10 | End: 1900-01-01

## 2023-02-10 NOTE — PHYSICAL EXAM
[Left] : left shoulder [There are no fractures, subluxations or dislocations. No significant abnormalities are seen] : There are no fractures, subluxations or dislocations. No significant abnormalities are seen [Components well fixed, in good position] : Components well fixed, in good position [de-identified] : Constitutional: The general appearance of the patient is well developed, well nourished, no deformities and well groomed. Normal \par \par Gait: Gait and function is as follows: normal gait. \par \par Skin: Head and neck visualized skin is normal. Left upper extremity visualized skin is normal. Right upper extremity visualized skin is normal. Thoracic Skin of the thoracic spine shows visualized skin is normal. \par \par Cardiovascular: palpable radial pulse bilaterally, good capillary refill in digits of the bilateral upper extremities and no temperature or color changes in the bilateral upper extremities. \par \par Lymphatic: Normal Palpation of lymph nodes in the cervical. \par \par Neurologic: fine motor control in the bilateral upper extremities is intact. Deep Tendon Reflexes in Upper and Lower Extremities Negative Mathew's in the bilateral upper extremities. The patient is oriented to time, place and person. Sensation to light touch intact in the bilateral upper extremities. Mood and Affect is normal. \par \par Left Knee: Inspection of the knee is as follows: mild effusion. no ecchymosis and no streaking. Palpation of the knee is as follows: medial joint line tenderness and patella tendon tenderness. Knee Range of Motion is as follows: Range of motion is limited secondary to pain. Strength examination of the knee is as follows: Quadriceps strength is 5/5 Hamstring strength is 5/5 Ligament Stability and Special Test ligamentously stable. positive mcmurrays. Neurological examination of the knee is as follows: light touch is intact throughout. Gait and function is as follows: normal gait\par Right Shoulder:  Inspection of the shoulder/upper arm is as follows: There is a full, pain-free, stable range of motion of the shoulder with normal strength and no tenderness to palpation. \par \par Left Shoulder: Inspection of the shoulder/upper arm is as follows: no scapula winging, no biceps deformity and no AC joint deformity. Palpation of the shoulder/upper arm is as follows: There is tenderness at the proximal biceps tendon. Range of motion of the shoulder is as follows: Pain with internal rotation, external rotation, abduction and forward flexion. Strength of the shoulder is as follows: Supraspinatus 4/5. External Rotation 4/5. Internal Rotation 4/5. Deltoid 5/5 Ligament Stability and Special Tests of the shoulder is as follows: Neer test is positive. Anderson' test is positive. Speed's test is positive. \par \par Neck: \par Inspection / Palpation of the cervical spine is as follows: There is a full, pain free, stable range of motion of the cervical spine with normal tone and no tenderness to palpation. \par \par Back, including spine: Inspection / Palpation of the thoracic/lumbar spine is as follows: There is a full, pain free, stable range of motion of the thoracic spine with a normal tone and not tenderness to palpation..

## 2023-02-10 NOTE — HISTORY OF PRESENT ILLNESS
[de-identified] : 70-year-old female who underwent reverse shoulder arthroplasty proximally 5 months ago. She had 2 previous rotator cuff repairs by Dr. Etienne which restored. Patient reports that prior to her reverse shoulder arthroplasty she had pain at rest and severe pain with trying to use the arm.\par Pain did not radiate into the hands and fingers. Patient had no diagnosis of herniated disc in the cervical region.\par Patient notes significant pain after the reverse shoulder arthroplasty which has been getting significantly worse.\par ESR and CRP were within normal limits. She denies fevers sweats chills erythema or wound dehiscence issues.\par Patient's  who is a personal friend brought her in for a second opinion regarding the etiology of her pain. Of note she did have a CT scan done which was read as posterior inferior glenoid fracture as well as lateral calcar humerus fracture.\par  The patient is Right handed.\par 7/28/20: patient returns today for follow up of ongoing left shoulder pain. Her pain is now constant and severe. She continues to report difficulty with activities of daily living. SHe presents to discuss surgery.\par 8/14/20: patient presents 10 days s/o left revision reverse shoulder arthroplasty with removal of previous rotator cuff anchor.\par \par 8/28/20: patient presents 4 weeks s/o left revision reverse shoulder arthroplasty with removal of previous rotator cuff anchor. The patient is doing well post operatively. She also presents with additional complaint of left knee pain. She has history of OA and reports recent exacerbation over the last 2-3 weeks. Pain is worse with walking long distances.\par 9/11/20: Patient returns today for follow up of left knee pain. Previous cortisone injection did not provide significant relief. She continues to report difficulty. She is interested in discussing further treatment options.\par patient is also recovering from left revision reverse shoulder arthroplasty. Some discomfort in the anterolateral aspect of the shoulder.\par 9/18/20: Patient returns today for follow up of left knee pain. She reports mild relief thus far from the first of her lubricating injections. She presents today for the 2nd of those injections.\par 9/25/20: Patient returns today for follow up of left knee pain. She reports mild relief thus far from her lubricating injections. She presents today for the 3rd of those injections.\par 10/5/20: Patient returns today for follow up regarding her neck and left shoulder pain. She denies any discrete trauma. She reports a significant atraumatic exacerbation of her pain after the weekend. She has been wearing a sling for immobilization of her LUE.\par \par 2/26/21: 6 months status post revision left reverse arthroplasty doing well. Also bilateral knee pain.\par She had an exacerbation of superior and lateral pain. She is exacerbation of neck pain and radicular symptoms.\par \par 5/24/21: Patient presents today for evaluation for recent left knee exacerbation. patient was last treated with orthovisc more than 6 months ago. She did report moderate improvement. Currently pain is constant. Pain is to medial aspect of her knee.\par \par 5/28/21: patient reports Orthovisc to her left knee last visit helped. She is here to discuss the possibility of a second injection.\par \par 6/4/21: patient presents to discuss her ongoing worsening left knee pain. She is here to discuss the possibility of proceeding with third Orthovisc injection to left knee.\par \par 8/9/21:patient reports that she was in the hospital with sepsis. Even prior to that however her shoulder has become increasingly more painful in the anterior aspect of the shoulder. She did have dramatic relief with prior epidural steroid injection.\par \par 8/16/21: patient reports a flareup of pain just lateral to the sternum on the left side I got worse with therapy. She is moving her left shoulder fluidly.\par \par 12/3/21: Patient returns with left knee pain. States a constant and severe pain. With respect to her neck and shoulder, she has been seeing Dr. Frankenberger for epidural injections and has no alleviation of pain.\par \par 12/10/21: Patient presents with ongoing left knee pain. Previous glenohumeral injection to her right knee provided moderate relief. She is here to discuss series of orthovisc injections .\par \par 12/17/21: patient presents with ongoing left knee pain. Previous orthovisc injection provided mild relief for the patient. She is here to discuss second orthovisc injections.\par \par 12/27/21: Patient presents with ongoing left knee pain. her previous orthovisc injection provided moderate relief for the patient. States no negative reactions. She is here to discuss third orthovisc injection.\par \par 4/8/22: Patient presents with new onset of left foot swelling x1 week. She denies any pain with walking or weight bearing. She was seen at [facility], by her PCP and Dr. Frankenberger who could not diagnose her left foot swelling. No swelling to her right foot. She soaked her foot in epsom salt and elevated it with no relief. \par \par 6/27/22: Patient presents for repeat evaluation of left knee pain. She experienced previous relief with previous series of orthovisc. Patient continues to have pain worsening over the last few days. She presents today to discuss first injection of orthovisc.\par 7/8/22: Patient presents for repeat evaluation of left knee pain. She admits mild relief from first injection of orthovisc. Denies any negative reactions. She presents to discuss second injection. \par 7/18/22: Patient presents for repeat evaluation of left knee pain. She admits moderate relief from second injection of orthovisc. Denies any negative reactions. She presents to discuss third injection. \par \par 9/19/22: Patient presents for treatment of left knee pain.  She is having surgery in 1 week on her neck and is having difficulty ambulating.\par 12/5/22: Patient presents for repeat evaluation of left knee pain. She states overall relief from previous series of orthovisc injections. She presents to discuss cortisone injection. \par \par 2/10/23: Patient presents for evaluation of left shoulder pain for 3 months. Patient denies new injuries or traumas. She is unable to raise her arm without pain.

## 2023-02-10 NOTE — DISCUSSION/SUMMARY
[Medication Risks Reviewed] : Medication risks reviewed [de-identified] : 72-year-old female who is now more than one year removed from left reverse shoulder arthroplasty was done as a revision.\par Patient now reports left shoulder pain for 3 months. She is unable to raise her arm without pain.\par X-rays reviewed of the shoulder with the patient demonstrate well fixed components.\par Patient was prescribed a 6 day course of MDP for pain relief.\par Patient was also prescribed a muscle relaxer.\par Patient will follow up as needed for her shoulder.\par \par \par (1) We discussed a comprehensive treatment plans that included a prescription management plan involving the use of prescription strength medications to include Ibuprofen 600- 800 mg TID, versus 500- 650 mg Tylenol. We also discussed prescribing topical diclofenac (Voltaren gel) as well as once daily Meloxicam 15 mg.\par (2) The patient has More Than One chronic injuries/illnesses as outlined, discussed, and documented by ICD 10 codes listed, as well as the HPI and Plan section.\par There is a moderate risk of morbidity with further treatment, especially from use of prescription strength medications and possible side effects of these medications which include upset stomach and cardiac/renal issues with long term use were discussed.\par (3) I recommended that the patient follow-up with their medical physician to discuss any significant specific potential issues with long term use such as interactions with current medications or with exacerbation of underlying medical morbidities. \par \par Attestation:\par I, Nicolle Peoples, attest that this documentation has been prepared under the direction and in the presence of Provider Huber Bain MD.\par The documentation recorded by the scribe, in my presence, accurately reflects the service I personally performed, and the decisions made by me with my edits as appropriate.\par Huber Bain MD\par

## 2023-03-01 ENCOUNTER — APPOINTMENT (OUTPATIENT)
Dept: ORTHOPEDIC SURGERY | Facility: CLINIC | Age: 73
End: 2023-03-01

## 2023-03-23 ENCOUNTER — FORM ENCOUNTER (OUTPATIENT)
Age: 73
End: 2023-03-23

## 2023-04-03 ENCOUNTER — APPOINTMENT (OUTPATIENT)
Dept: ORTHOPEDIC SURGERY | Facility: CLINIC | Age: 73
End: 2023-04-03

## 2023-04-28 ENCOUNTER — APPOINTMENT (OUTPATIENT)
Dept: ORTHOPEDIC SURGERY | Facility: CLINIC | Age: 73
End: 2023-04-28
Payer: MEDICARE

## 2023-04-28 DIAGNOSIS — M75.42 IMPINGEMENT SYNDROME OF LEFT SHOULDER: ICD-10-CM

## 2023-04-28 PROCEDURE — 99214 OFFICE O/P EST MOD 30 MIN: CPT

## 2023-04-28 RX ORDER — GABAPENTIN 100 MG/1
100 CAPSULE ORAL 3 TIMES DAILY
Qty: 90 | Refills: 0 | Status: ACTIVE | COMMUNITY
Start: 2023-04-28 | End: 1900-01-01

## 2023-04-28 NOTE — PHYSICAL EXAM
[de-identified] : Constitutional: The general appearance of the patient is well developed, well nourished, no deformities and well groomed. Normal \par \par Gait: Gait and function is as follows: normal gait. \par \par Skin: Head and neck visualized skin is normal. Left upper extremity visualized skin is normal. Right upper extremity visualized skin is normal. Thoracic Skin of the thoracic spine shows visualized skin is normal. \par \par Cardiovascular: palpable radial pulse bilaterally, good capillary refill in digits of the bilateral upper extremities and no temperature or color changes in the bilateral upper extremities. \par \par Lymphatic: Normal Palpation of lymph nodes in the cervical. \par \par Neurologic: fine motor control in the bilateral upper extremities is intact. Deep Tendon Reflexes in Upper and Lower Extremities Negative Mathew's in the bilateral upper extremities. The patient is oriented to time, place and person. Sensation to light touch intact in the bilateral upper extremities. Mood and Affect is normal. \par \par Left Knee: Inspection of the knee is as follows: mild effusion. no ecchymosis and no streaking. Palpation of the knee is as follows: medial joint line tenderness and patella tendon tenderness. Knee Range of Motion is as follows: Range of motion is limited secondary to pain. Strength examination of the knee is as follows: Quadriceps strength is 5/5 Hamstring strength is 5/5 Ligament Stability and Special Test ligamentously stable. positive mcmurrays. Neurological examination of the knee is as follows: light touch is intact throughout. Gait and function is as follows: normal gait\par Right Shoulder:  Inspection of the shoulder/upper arm is as follows: There is a full, pain-free, stable range of motion of the shoulder with normal strength and no tenderness to palpation. \par \par Left Shoulder: Inspection of the shoulder/upper arm is as follows: no scapula winging, no biceps deformity and no AC joint deformity. Palpation of the shoulder/upper arm is as follows: There is tenderness at the proximal biceps tendon. Range of motion of the shoulder is as follows: Pain with internal rotation, external rotation, abduction and forward flexion. Strength of the shoulder is as follows: Supraspinatus 4/5. External Rotation 4/5. Internal Rotation 4/5. Deltoid 5/5 Ligament Stability and Special Tests of the shoulder is as follows: Neer test is positive. Anderson' test is positive. Speed's test is positive. \par \par Neck: \par Inspection / Palpation of the cervical spine is as follows: There is a full, pain free, stable range of motion of the cervical spine with normal tone and no tenderness to palpation. \par \par Back, including spine: Inspection / Palpation of the thoracic/lumbar spine is as follows: There is a full, pain free, stable range of motion of the thoracic spine with a normal tone and not tenderness to palpation..
Statement Selected

## 2023-04-28 NOTE — DISCUSSION/SUMMARY
[Medication Risks Reviewed] : Medication risks reviewed [de-identified] : LUE: mild TTP lateral acromion\par mild pain with resisted abduction\par \par 72-year-old female who is now more than one year removed from left reverse shoulder arthroplasty was done as a revision.\par Patient now reports left shoulder pain worse over the last few weeks as result of overuse.\par Discussed using the sling to control ROM and pain 1-2 weeks\par Previous X-rays  demonstrate well fixed components.\par \par  She will continue to use Voltaren Gel for pain relief.\par Patient was also previously prescribed a muscle relaxer.\par Her Gabapentin medication was renewed today. \par She will proceed with ADLs using caution. \par Patient will follow up as needed for her shoulder.\par If pain continues to persist, we can consider obtaining an MRI.\par \par (1) We discussed a comprehensive treatment plans that included a prescription management plan involving the use of prescription strength medications to include Ibuprofen 600- 800 mg TID, versus 500- 650 mg Tylenol. We also discussed prescribing topical diclofenac (Voltaren gel) as well as once daily Meloxicam 15 mg.\par (2) The patient has More Than One chronic injuries/illnesses as outlined, discussed, and documented by ICD 10 codes listed, as well as the HPI and Plan section.\par There is a moderate risk of morbidity with further treatment, especially from use of prescription strength medications and possible side effects of these medications which include upset stomach and cardiac/renal issues with long term use were discussed.\par (3) I recommended that the patient follow-up with their medical physician to discuss any significant specific potential issues with long term use such as interactions with current medications or with exacerbation of underlying medical morbidities. \par \par Attestation:\par I, Nic Stallworth, attest that this documentation has been prepared under the direction and in the presence of Provider Huber Bani MD.\par The documentation recorded by the scribe, in my presence, accurately reflects the service I personally performed, and the decisions made by me with my edits as appropriate.\par Huber Bain MD\par

## 2023-04-28 NOTE — HISTORY OF PRESENT ILLNESS
[de-identified] : 70-year-old female who underwent reverse shoulder arthroplasty proximally 5 months ago. She had 2 previous rotator cuff repairs by Dr. Etienne which restored. Patient reports that prior to her reverse shoulder arthroplasty she had pain at rest and severe pain with trying to use the arm.\par Pain did not radiate into the hands and fingers. Patient had no diagnosis of herniated disc in the cervical region.\par Patient notes significant pain after the reverse shoulder arthroplasty which has been getting significantly worse.\par ESR and CRP were within normal limits. She denies fevers sweats chills erythema or wound dehiscence issues.\par Patient's  who is a personal friend brought her in for a second opinion regarding the etiology of her pain. Of note she did have a CT scan done which was read as posterior inferior glenoid fracture as well as lateral calcar humerus fracture.\par  The patient is Right handed.\par 7/28/20: patient returns today for follow up of ongoing left shoulder pain. Her pain is now constant and severe. She continues to report difficulty with activities of daily living. SHe presents to discuss surgery.\par 8/14/20: patient presents 10 days s/o left revision reverse shoulder arthroplasty with removal of previous rotator cuff anchor.\par \par 8/28/20: patient presents 4 weeks s/o left revision reverse shoulder arthroplasty with removal of previous rotator cuff anchor. The patient is doing well post operatively. She also presents with additional complaint of left knee pain. She has history of OA and reports recent exacerbation over the last 2-3 weeks. Pain is worse with walking long distances.\par 9/11/20: Patient returns today for follow up of left knee pain. Previous cortisone injection did not provide significant relief. She continues to report difficulty. She is interested in discussing further treatment options.\par patient is also recovering from left revision reverse shoulder arthroplasty. Some discomfort in the anterolateral aspect of the shoulder.\par 9/18/20: Patient returns today for follow up of left knee pain. She reports mild relief thus far from the first of her lubricating injections. She presents today for the 2nd of those injections.\par 9/25/20: Patient returns today for follow up of left knee pain. She reports mild relief thus far from her lubricating injections. She presents today for the 3rd of those injections.\par 10/5/20: Patient returns today for follow up regarding her neck and left shoulder pain. She denies any discrete trauma. She reports a significant atraumatic exacerbation of her pain after the weekend. She has been wearing a sling for immobilization of her LUE.\par \par 2/26/21: 6 months status post revision left reverse arthroplasty doing well. Also bilateral knee pain.\par She had an exacerbation of superior and lateral pain. She is exacerbation of neck pain and radicular symptoms.\par \par 5/24/21: Patient presents today for evaluation for recent left knee exacerbation. patient was last treated with orthovisc more than 6 months ago. She did report moderate improvement. Currently pain is constant. Pain is to medial aspect of her knee.\par \par 5/28/21: patient reports Orthovisc to her left knee last visit helped. She is here to discuss the possibility of a second injection.\par \par 6/4/21: patient presents to discuss her ongoing worsening left knee pain. She is here to discuss the possibility of proceeding with third Orthovisc injection to left knee.\par \par 8/9/21:patient reports that she was in the hospital with sepsis. Even prior to that however her shoulder has become increasingly more painful in the anterior aspect of the shoulder. She did have dramatic relief with prior epidural steroid injection.\par \par 8/16/21: patient reports a flareup of pain just lateral to the sternum on the left side I got worse with therapy. She is moving her left shoulder fluidly.\par \par 12/3/21: Patient returns with left knee pain. States a constant and severe pain. With respect to her neck and shoulder, she has been seeing Dr. Frankenberger for epidural injections and has no alleviation of pain.\par \par 12/10/21: Patient presents with ongoing left knee pain. Previous glenohumeral injection to her right knee provided moderate relief. She is here to discuss series of orthovisc injections .\par \par 12/17/21: patient presents with ongoing left knee pain. Previous orthovisc injection provided mild relief for the patient. She is here to discuss second orthovisc injections.\par \par 12/27/21: Patient presents with ongoing left knee pain. her previous orthovisc injection provided moderate relief for the patient. States no negative reactions. She is here to discuss third orthovisc injection.\par \par 4/8/22: Patient presents with new onset of left foot swelling x1 week. She denies any pain with walking or weight bearing. She was seen at [facility], by her PCP and Dr. Frankenberger who could not diagnose her left foot swelling. No swelling to her right foot. She soaked her foot in epsom salt and elevated it with no relief. \par \par 6/27/22: Patient presents for repeat evaluation of left knee pain. She experienced previous relief with previous series of orthovisc. Patient continues to have pain worsening over the last few days. She presents today to discuss first injection of orthovisc.\par 7/8/22: Patient presents for repeat evaluation of left knee pain. She admits mild relief from first injection of orthovisc. Denies any negative reactions. She presents to discuss second injection. \par 7/18/22: Patient presents for repeat evaluation of left knee pain. She admits moderate relief from second injection of orthovisc. Denies any negative reactions. She presents to discuss third injection. \par \par 9/19/22: Patient presents for treatment of left knee pain.  She is having surgery in 1 week on her neck and is having difficulty ambulating.\par 12/5/22: Patient presents for repeat evaluation of left knee pain. She states overall relief from previous series of orthovisc injections. She presents to discuss cortisone injection. \par \par 2/10/23: Patient presents for evaluation of left shoulder pain for 3 months. Patient denies new injuries or traumas. She is unable to raise her arm without pain.\par \par 4/28/23 Pt returns for follow up left shoulder. Pt reports she was in [place] and reports significant pain in her anterior left shoulder. Pain has prevented her from sleeping properly. She reports decrease ROM.  Patient reports that she used her left upper extremity a lot more than typical recently.

## 2023-05-20 RX ORDER — GABAPENTIN 100 MG/1
100 CAPSULE ORAL 3 TIMES DAILY
Qty: 90 | Refills: 0 | Status: ACTIVE | COMMUNITY
Start: 2023-05-20 | End: 1900-01-01

## 2023-06-23 ENCOUNTER — APPOINTMENT (OUTPATIENT)
Dept: ORTHOPEDIC SURGERY | Facility: CLINIC | Age: 73
End: 2023-06-23
Payer: MEDICARE

## 2023-06-23 PROCEDURE — 20610 DRAIN/INJ JOINT/BURSA W/O US: CPT | Mod: RT

## 2023-06-23 PROCEDURE — 99214 OFFICE O/P EST MOD 30 MIN: CPT | Mod: 25,57

## 2023-06-23 NOTE — PROCEDURE
[FreeTextEntry3] : Large Joint Injection was performed because of pain and inflammation.\par \par Anesthesia: ethyl chloride sprayed topically..\par \par Depomedrol: An injection of Depomedrol 40 mg , 1 cc.\par \par Lidocaine: 7 cc.\par \par Medication was injected in the right subacromial space. Patient has tried OTC's including aspirin, Ibuprofen, Aleve etc or prescription NSAIDS, and/or exercises at home and/ or physical therapy without satisfactory response and Patient has decreased mobility in the joint. After verbal consent using sterile preparation and technique. The risks, benefits, and alternatives to cortisone injection were explained in full to the patient. Risks outlined include but are not limited to infection, sepsis, bleeding, scarring, skin discoloration, temporary increase in pain, syncopal episode, failure to resolve symptoms, allergic reaction, symptom recurrence, and elevation of blood sugar in diabetics. Patient understood the risks. All questions were answered. After discussion of options, patient requested an injection. Oral informed consent was obtained and sterile prep was done of the injection site. Sterile technique was utilized for the procedure including the preparation of the solutions used for the injection. Patient tolerated the procedure well. Advised to ice the injection site this evening. Prep with alcohol locally to site. Sterile technique used. Patient tolerated procedure well. Post Procedure Instructions: Patient was advised to call if redness, pain, or fever occur and apply ice for 15 min. out of every hour for the next 12-24 hours as tolerated. patient was advised to rest the joint(s) for 7 days.\par \par

## 2023-06-23 NOTE — DISCUSSION/SUMMARY
[Medication Risks Reviewed] : Medication risks reviewed [de-identified] : 73-year-old female who is now more than one year removed from left reverse shoulder arthroplasty was done as a revision.\par Patient now reports left shoulder and right shoulder are painful \par Previous X-rays  demonstrate well fixed components.\par \par Right shoulder consistent with impingement versus referred pain from cervical region.\par She will continue to use Voltaren Gel for pain relief.\par Patient was also previously prescribed a muscle relaxer.\par She will proceed with ADLs using caution. \par Subacromial injection for pain\par Patient will follow up as needed for her shoulder.\par Recommend patient see pain management doctor to titrate her dose of gabapentin.\par If pain continues to persist, we can consider obtaining an MRI.\par \par \par (1) We discussed a comprehensive treatment plans that included a prescription management plan involving the use of prescription strength medications to include Ibuprofen 600- 800 mg TID, versus 500- 650 mg Tylenol. We also discussed prescribing topical diclofenac (Voltaren gel) as well as once daily Meloxicam 15 mg.\par (2) The patient has More Than One chronic injuries/illnesses as outlined, discussed, and documented by ICD 10 codes listed, as well as the HPI and Plan section.\par There is a moderate risk of morbidity with further treatment, especially from use of prescription strength medications and possible side effects of these medications which include upset stomach and cardiac/renal issues with long term use were discussed.\par (3) I recommended that the patient follow-up with their medical physician to discuss any significant specific potential issues with long term use such as interactions with current medications or with exacerbation of underlying medical morbidities. \par \par Attestation:\par I, Adry Matthew, attest that this documentation has been prepared under the direction and in the presence of Provider Huber Bain MD.\par The documentation recorded by the scribe, in my presence, accurately reflects the service I personally performed, and the decisions made by me with my edits as appropriate.\par Huber Bain MD\par

## 2023-06-23 NOTE — HISTORY OF PRESENT ILLNESS
[de-identified] : 70-year-old female who underwent reverse shoulder arthroplasty proximally 5 months ago. She had 2 previous rotator cuff repairs by Dr. Etienne which restored. Patient reports that prior to her reverse shoulder arthroplasty she had pain at rest and severe pain with trying to use the arm.\par Pain did not radiate into the hands and fingers. Patient had no diagnosis of herniated disc in the cervical region.\par Patient notes significant pain after the reverse shoulder arthroplasty which has been getting significantly worse.\par ESR and CRP were within normal limits. She denies fevers sweats chills erythema or wound dehiscence issues.\par Patient's  who is a personal friend brought her in for a second opinion regarding the etiology of her pain. Of note she did have a CT scan done which was read as posterior inferior glenoid fracture as well as lateral calcar humerus fracture.\par  The patient is Right handed.\par 7/28/20: patient returns today for follow up of ongoing left shoulder pain. Her pain is now constant and severe. She continues to report difficulty with activities of daily living. SHe presents to discuss surgery.\par 8/14/20: patient presents 10 days s/o left revision reverse shoulder arthroplasty with removal of previous rotator cuff anchor.\par \par 8/28/20: patient presents 4 weeks s/o left revision reverse shoulder arthroplasty with removal of previous rotator cuff anchor. The patient is doing well post operatively. She also presents with additional complaint of left knee pain. She has history of OA and reports recent exacerbation over the last 2-3 weeks. Pain is worse with walking long distances.\par 9/11/20: Patient returns today for follow up of left knee pain. Previous cortisone injection did not provide significant relief. She continues to report difficulty. She is interested in discussing further treatment options.\par patient is also recovering from left revision reverse shoulder arthroplasty. Some discomfort in the anterolateral aspect of the shoulder.\par 9/18/20: Patient returns today for follow up of left knee pain. She reports mild relief thus far from the first of her lubricating injections. She presents today for the 2nd of those injections.\par 9/25/20: Patient returns today for follow up of left knee pain. She reports mild relief thus far from her lubricating injections. She presents today for the 3rd of those injections.\par 10/5/20: Patient returns today for follow up regarding her neck and left shoulder pain. She denies any discrete trauma. She reports a significant atraumatic exacerbation of her pain after the weekend. She has been wearing a sling for immobilization of her LUE.\par \par 2/26/21: 6 months status post revision left reverse arthroplasty doing well. Also bilateral knee pain.\par She had an exacerbation of superior and lateral pain. She is exacerbation of neck pain and radicular symptoms.\par \par 5/24/21: Patient presents today for evaluation for recent left knee exacerbation. patient was last treated with orthovisc more than 6 months ago. She did report moderate improvement. Currently pain is constant. Pain is to medial aspect of her knee.\par \par 5/28/21: patient reports Orthovisc to her left knee last visit helped. She is here to discuss the possibility of a second injection.\par \par 6/4/21: patient presents to discuss her ongoing worsening left knee pain. She is here to discuss the possibility of proceeding with third Orthovisc injection to left knee.\par \par 8/9/21:patient reports that she was in the hospital with sepsis. Even prior to that however her shoulder has become increasingly more painful in the anterior aspect of the shoulder. She did have dramatic relief with prior epidural steroid injection.\par \par 8/16/21: patient reports a flareup of pain just lateral to the sternum on the left side I got worse with therapy. She is moving her left shoulder fluidly.\par \par 12/3/21: Patient returns with left knee pain. States a constant and severe pain. With respect to her neck and shoulder, she has been seeing Dr. Frankenberger for epidural injections and has no alleviation of pain.\par \par 12/10/21: Patient presents with ongoing left knee pain. Previous glenohumeral injection to her right knee provided moderate relief. She is here to discuss series of orthovisc injections .\par \par 12/17/21: patient presents with ongoing left knee pain. Previous orthovisc injection provided mild relief for the patient. She is here to discuss second orthovisc injections.\par \par 12/27/21: Patient presents with ongoing left knee pain. her previous orthovisc injection provided moderate relief for the patient. States no negative reactions. She is here to discuss third orthovisc injection.\par \par 4/8/22: Patient presents with new onset of left foot swelling x1 week. She denies any pain with walking or weight bearing. She was seen at [facility], by her PCP and Dr. Frankenberger who could not diagnose her left foot swelling. No swelling to her right foot. She soaked her foot in epsom salt and elevated it with no relief. \par \par 6/27/22: Patient presents for repeat evaluation of left knee pain. She experienced previous relief with previous series of orthovisc. Patient continues to have pain worsening over the last few days. She presents today to discuss first injection of orthovisc.\par 7/8/22: Patient presents for repeat evaluation of left knee pain. She admits mild relief from first injection of orthovisc. Denies any negative reactions. She presents to discuss second injection. \par 7/18/22: Patient presents for repeat evaluation of left knee pain. She admits moderate relief from second injection of orthovisc. Denies any negative reactions. She presents to discuss third injection. \par \par 9/19/22: Patient presents for treatment of left knee pain.  She is having surgery in 1 week on her neck and is having difficulty ambulating.\par 12/5/22: Patient presents for repeat evaluation of left knee pain. She states overall relief from previous series of orthovisc injections. She presents to discuss cortisone injection. \par \par 2/10/23: Patient presents for evaluation of left shoulder pain for 3 months. Patient denies new injuries or traumas. She is unable to raise her arm without pain.\par \par 4/28/23 Pt returns for follow up left shoulder. Pt reports she was in [place] and reports significant pain in her anterior left shoulder. Pain has prevented her from sleeping properly. She reports decrease ROM.  Patient reports that she used her left upper extremity a lot more than typical recently.\par \par 6/23/23: Pt here for left shoulder pain. Pt admits to right shoulder starting to be painful as well. Pt states her pain level is 4/10 and when she lifts her shoulder it is a 5/10.  Patient reports increased pain as result of rehabbing from her total knee replacement.  She does have severe cervical stenosis status post decompression.

## 2023-06-23 NOTE — PHYSICAL EXAM
[de-identified] : Constitutional: The general appearance of the patient is well developed, well nourished, no deformities and well groomed. Normal \par \par Gait: Gait and function is as follows: normal gait. \par \par Skin: Head and neck visualized skin is normal. Left upper extremity visualized skin is normal. Right upper extremity visualized skin is normal. Thoracic Skin of the thoracic spine shows visualized skin is normal. \par \par Cardiovascular: palpable radial pulse bilaterally, good capillary refill in digits of the bilateral upper extremities and no temperature or color changes in the bilateral upper extremities. \par \par Lymphatic: Normal Palpation of lymph nodes in the cervical. \par \par Neurologic: fine motor control in the bilateral upper extremities is intact. Deep Tendon Reflexes in Upper and Lower Extremities Negative Mathew's in the bilateral upper extremities. The patient is oriented to time, place and person. Sensation to light touch intact in the bilateral upper extremities. Mood and Affect is normal. \par \par Left Knee: Inspection of the knee is as follows: mild effusion. no ecchymosis and no streaking. Palpation of the knee is as follows: medial joint line tenderness and patella tendon tenderness. Knee Range of Motion is as follows: Range of motion is limited secondary to pain. Strength examination of the knee is as follows: Quadriceps strength is 5/5 Hamstring strength is 5/5 Ligament Stability and Special Test ligamentously stable. positive mcmurrays. Neurological examination of the knee is as follows: light touch is intact throughout. Gait and function is as follows: normal gait\par Right Shoulder:  Inspection of the shoulder/upper arm is as follows: There is a full, pain-free, stable range of motion of the shoulder with normal strength and no tenderness to palpation. \par \par Left Shoulder: Inspection of the shoulder/upper arm is as follows: no scapula winging, no biceps deformity and no AC joint deformity. Palpation of the shoulder/upper arm is as follows: There is tenderness at the proximal biceps tendon. Range of motion of the shoulder is as follows: Pain with internal rotation, external rotation, abduction and forward flexion. Strength of the shoulder is as follows: Supraspinatus 4/5. External Rotation 4/5. Internal Rotation 4/5. Deltoid 5/5 Ligament Stability and Special Tests of the shoulder is as follows: Neer test is positive. Anderson' test is positive. Speed's test is positive. \par \par Neck: \par Inspection / Palpation of the cervical spine is as follows: There is a full, pain free, stable range of motion of the cervical spine with normal tone and no tenderness to palpation. \par \par Back, including spine: Inspection / Palpation of the thoracic/lumbar spine is as follows: There is a full, pain free, stable range of motion of the thoracic spine with a normal tone and not tenderness to palpation..

## 2023-08-28 ENCOUNTER — APPOINTMENT (OUTPATIENT)
Dept: ORTHOPEDIC SURGERY | Facility: CLINIC | Age: 73
End: 2023-08-28
Payer: MEDICARE

## 2023-08-28 DIAGNOSIS — M25.561 PAIN IN RIGHT KNEE: ICD-10-CM

## 2023-08-28 DIAGNOSIS — M17.11 UNILATERAL PRIMARY OSTEOARTHRITIS, RIGHT KNEE: ICD-10-CM

## 2023-08-28 PROCEDURE — 99214 OFFICE O/P EST MOD 30 MIN: CPT

## 2023-08-28 RX ORDER — LIDOCAINE 5% 700 MG/1
5 PATCH TOPICAL
Qty: 21 | Refills: 0 | Status: ACTIVE | COMMUNITY
Start: 2023-08-28 | End: 1900-01-01

## 2023-08-28 NOTE — PROCEDURE
[All ultrasound images have been permanently captured and stored accordingly in our picture archiving and communication system] : All ultrasound images have been permanently captured and stored accordingly in our picture archiving and communication system [Visualization of the needle and placement of injection was performed without complication] : visualization of the needle and placement of injection was performed without complication [Pain] : pain [Inflammation] : inflammation

## 2023-08-28 NOTE — HISTORY OF PRESENT ILLNESS
[de-identified] : 70-year-old female who underwent reverse shoulder arthroplasty proximally 5 months ago. She had 2 previous rotator cuff repairs by Dr. Etienne which restored. Patient reports that prior to her reverse shoulder arthroplasty she had pain at rest and severe pain with trying to use the arm. Pain did not radiate into the hands and fingers. Patient had no diagnosis of herniated disc in the cervical region. Patient notes significant pain after the reverse shoulder arthroplasty which has been getting significantly worse. ESR and CRP were within normal limits. She denies fevers sweats chills erythema or wound dehiscence issues. Patient's  who is a personal friend brought her in for a second opinion regarding the etiology of her pain. Of note she did have a CT scan done which was read as posterior inferior glenoid fracture as well as lateral calcar humerus fracture.  The patient is Right handed. 7/28/20: patient returns today for follow up of ongoing left shoulder pain. Her pain is now constant and severe. She continues to report difficulty with activities of daily living. SHe presents to discuss surgery. 8/14/20: patient presents 10 days s/o left revision reverse shoulder arthroplasty with removal of previous rotator cuff anchor.  8/28/20: patient presents 4 weeks s/o left revision reverse shoulder arthroplasty with removal of previous rotator cuff anchor. The patient is doing well post operatively. She also presents with additional complaint of left knee pain. She has history of OA and reports recent exacerbation over the last 2-3 weeks. Pain is worse with walking long distances. 9/11/20: Patient returns today for follow up of left knee pain. Previous cortisone injection did not provide significant relief. She continues to report difficulty. She is interested in discussing further treatment options. patient is also recovering from left revision reverse shoulder arthroplasty. Some discomfort in the anterolateral aspect of the shoulder. 9/18/20: Patient returns today for follow up of left knee pain. She reports mild relief thus far from the first of her lubricating injections. She presents today for the 2nd of those injections. 9/25/20: Patient returns today for follow up of left knee pain. She reports mild relief thus far from her lubricating injections. She presents today for the 3rd of those injections. 10/5/20: Patient returns today for follow up regarding her neck and left shoulder pain. She denies any discrete trauma. She reports a significant atraumatic exacerbation of her pain after the weekend. She has been wearing a sling for immobilization of her LUE.  2/26/21: 6 months status post revision left reverse arthroplasty doing well. Also bilateral knee pain. She had an exacerbation of superior and lateral pain. She is exacerbation of neck pain and radicular symptoms.  5/24/21: Patient presents today for evaluation for recent left knee exacerbation. patient was last treated with orthovisc more than 6 months ago. She did report moderate improvement. Currently pain is constant. Pain is to medial aspect of her knee.  5/28/21: patient reports Orthovisc to her left knee last visit helped. She is here to discuss the possibility of a second injection.  6/4/21: patient presents to discuss her ongoing worsening left knee pain. She is here to discuss the possibility of proceeding with third Orthovisc injection to left knee.  8/9/21:patient reports that she was in the hospital with sepsis. Even prior to that however her shoulder has become increasingly more painful in the anterior aspect of the shoulder. She did have dramatic relief with prior epidural steroid injection.  8/16/21: patient reports a flareup of pain just lateral to the sternum on the left side I got worse with therapy. She is moving her left shoulder fluidly.  12/3/21: Patient returns with left knee pain. States a constant and severe pain. With respect to her neck and shoulder, she has been seeing Dr. Frankenberger for epidural injections and has no alleviation of pain.  12/10/21: Patient presents with ongoing left knee pain. Previous glenohumeral injection to her right knee provided moderate relief. She is here to discuss series of orthovisc injections .  12/17/21: patient presents with ongoing left knee pain. Previous orthovisc injection provided mild relief for the patient. She is here to discuss second orthovisc injections.  12/27/21: Patient presents with ongoing left knee pain. her previous orthovisc injection provided moderate relief for the patient. States no negative reactions. She is here to discuss third orthovisc injection.  4/8/22: Patient presents with new onset of left foot swelling x1 week. She denies any pain with walking or weight bearing. She was seen at [facility], by her PCP and Dr. Frankenberger who could not diagnose her left foot swelling. No swelling to her right foot. She soaked her foot in epsom salt and elevated it with no relief.   6/27/22: Patient presents for repeat evaluation of left knee pain. She experienced previous relief with previous series of orthovisc. Patient continues to have pain worsening over the last few days. She presents today to discuss first injection of orthovisc. 7/8/22: Patient presents for repeat evaluation of left knee pain. She admits mild relief from first injection of orthovisc. Denies any negative reactions. She presents to discuss second injection.  7/18/22: Patient presents for repeat evaluation of left knee pain. She admits moderate relief from second injection of orthovisc. Denies any negative reactions. She presents to discuss third injection.   9/19/22: Patient presents for treatment of left knee pain.  She is having surgery in 1 week on her neck and is having difficulty ambulating.  8/28/23: Patient here for a new complaint of Right knee pain. Pt has recently complained of right shoulder pain as well.

## 2023-08-28 NOTE — DISCUSSION/SUMMARY
[Medication Risks Reviewed] : Medication risks reviewed [de-identified] : 73-year-old female who is now more than one year removed from left reverse shoulder arthroplasty was done as a revision.  With respect to her right knee, patient has iliotibial band tightness, hamstring strain Advised voltaren  Lidocaine patch recommended  Theragun recommended  Patient will follow up as needed    (1) We discussed a comprehensive treatment plans that included a prescription management plan involving the use of prescription strength medications to include Ibuprofen 600-800 mg TID, versus 500-650 mg Tylenol. We also discussed prescribing topical diclofenac (Voltaren gel) as well as once daily Meloxicam 15 mg. (2) The patient has More Than One chronic injuries/illnesses as outlined, discussed, and documented by ICD 10 codes listed, as well as the HPI and Plan section. There is a moderate risk of morbidity with further treatment, especially from use of prescription strength medications and possible side effects of these medications which include upset stomach and cardiac/renal issues with long term use were discussed. (3) I recommended that the patient follow-up with their medical physician to discuss any significant specific potential issues with long term use such as interactions with current medications or with exacerbation of underlying medical morbidities.   Attestation: I, Adry Matthew, attest that this documentation has been prepared under the direction and in the presence of Provider Huber Bain MD. The documentation recorded by the scribe, in my presence, accurately reflects the service I personally performed, and the decisions made by me with my edits as appropriate. Huber Bain MD

## 2023-09-25 ENCOUNTER — APPOINTMENT (OUTPATIENT)
Dept: ORTHOPEDIC SURGERY | Facility: CLINIC | Age: 73
End: 2023-09-25
Payer: MEDICARE

## 2023-09-25 PROCEDURE — 99214 OFFICE O/P EST MOD 30 MIN: CPT | Mod: 25

## 2023-09-25 PROCEDURE — 73030 X-RAY EXAM OF SHOULDER: CPT | Mod: RT

## 2023-09-25 PROCEDURE — 20611 DRAIN/INJ JOINT/BURSA W/US: CPT | Mod: RT

## 2023-10-03 ENCOUNTER — RESULT REVIEW (OUTPATIENT)
Age: 73
End: 2023-10-03

## 2023-10-12 RX ORDER — METHYLPREDNISOLONE 4 MG/1
4 TABLET ORAL
Qty: 1 | Refills: 0 | Status: ACTIVE | COMMUNITY
Start: 2023-10-12 | End: 1900-01-01

## 2023-10-16 ENCOUNTER — RESULT REVIEW (OUTPATIENT)
Age: 73
End: 2023-10-16

## 2023-10-18 RX ORDER — OXYCODONE AND ACETAMINOPHEN 5; 325 MG/1; MG/1
5-325 TABLET ORAL
Qty: 20 | Refills: 0 | Status: ACTIVE | COMMUNITY
Start: 2023-10-18 | End: 1900-01-01

## 2023-10-20 ENCOUNTER — APPOINTMENT (OUTPATIENT)
Dept: ORTHOPEDIC SURGERY | Facility: CLINIC | Age: 73
End: 2023-10-20
Payer: MEDICARE

## 2023-10-20 DIAGNOSIS — M12.811 UNSPECIFIED ROTATOR CUFF TEAR OR RUPTURE OF RIGHT SHOULDER, NOT SPECIFIED AS TRAUMATIC: ICD-10-CM

## 2023-10-20 DIAGNOSIS — S46.011A STRAIN OF MUSCLE(S) AND TENDON(S) OF THE ROTATOR CUFF OF RIGHT SHOULDER, INITIAL ENCOUNTER: ICD-10-CM

## 2023-10-20 DIAGNOSIS — M75.101 UNSPECIFIED ROTATOR CUFF TEAR OR RUPTURE OF RIGHT SHOULDER, NOT SPECIFIED AS TRAUMATIC: ICD-10-CM

## 2023-10-20 PROCEDURE — 99214 OFFICE O/P EST MOD 30 MIN: CPT | Mod: 25

## 2023-10-20 PROCEDURE — 20611 DRAIN/INJ JOINT/BURSA W/US: CPT | Mod: RT

## 2023-11-15 NOTE — HISTORY OF PRESENT ILLNESS
[de-identified] : 70-year-old female who underwent reverse shoulder arthroplasty proximally 5 months ago. She had 2 previous rotator cuff repairs by Dr. Etienne which restored. Patient reports that prior to her reverse shoulder arthroplasty she had pain at rest and severe pain with trying to use the arm.\par Pain did not radiate into the hands and fingers. Patient had no diagnosis of herniated disc in the cervical region.\par Patient notes significant pain after the reverse shoulder arthroplasty which has been getting significantly worse.\par ESR and CRP were within normal limits. She denies fevers sweats chills erythema or wound dehiscence issues.\par Patient's  who is a personal friend brought her in for a second opinion regarding the etiology of her pain. Of note she did have a CT scan done which was read as posterior inferior glenoid fracture as well as lateral calcar humerus fracture.\par  The patient is Right handed.\par 7/28/20: patient returns today for follow up of ongoing left shoulder pain. Her pain is now constant and severe. She continues to report difficulty with activities of daily living. SHe presents to discuss surgery.\par 8/14/20: patient presents 10 days s/o left revision reverse shoulder arthroplasty with removal of previous rotator cuff anchor.\par \par 8/28/20: patient presents 4 weeks s/o left revision reverse shoulder arthroplasty with removal of previous rotator cuff anchor. The patient is doing well post operatively. She also presents with additional complaint of left knee pain. She has history of OA and reports recent exacerbation over the last 2-3 weeks. Pain is worse with walking long distances.\par 9/11/20: Patient returns today for follow up of left knee pain. Previous cortisone injection did not provide significant relief. She continues to report difficulty. She is interested in discussing further treatment options.\par patient is also recovering from left revision reverse shoulder arthroplasty. Some discomfort in the anterolateral aspect of the shoulder.\par 9/18/20: Patient returns today for follow up of left knee pain. She reports mild relief thus far from the first of her lubricating injections. She presents today for the 2nd of those injections.\par 9/25/20: Patient returns today for follow up of left knee pain. She reports mild relief thus far from her lubricating injections. She presents today for the 3rd of those injections.\par 10/5/20: Patient returns today for follow up regarding her neck and left shoulder pain. She denies any discrete trauma. She reports a significant atraumatic exacerbation of her pain after the weekend. She has been wearing a sling for immobilization of her LUE.\par \par 2/26/21: 6 months status post revision left reverse arthroplasty doing well. Also bilateral knee pain.\par She had an exacerbation of superior and lateral pain. She is exacerbation of neck pain and radicular symptoms.\par \par 5/24/21: Patient presents today for evaluation for recent left knee exacerbation. patient was last treated with orthovisc more than 6 months ago. She did report moderate improvement. Currently pain is constant. Pain is to medial aspect of her knee.\par \par 5/28/21: patient reports Orthovisc to her left knee last visit helped. She is here to discuss the possibility of a second injection.\par \par 6/4/21: patient presents to discuss her ongoing worsening left knee pain. She is here to discuss the possibility of proceeding with third Orthovisc injection to left knee.\par \par 8/9/21:patient reports that she was in the hospital with sepsis. Even prior to that however her shoulder has become increasingly more painful in the anterior aspect of the shoulder. She did have dramatic relief with prior epidural steroid injection.\par \par 8/16/21: patient reports a flareup of pain just lateral to the sternum on the left side I got worse with therapy. She is moving her left shoulder fluidly.\par \par 12/3/21: Patient returns with left knee pain. States a constant and severe pain. With respect to her neck and shoulder, she has been seeing Dr. Frankenberger for epidural injections and has no alleviation of pain.\par \par 12/10/21: Patient presents with ongoing left knee pain. Previous glenohumeral injection to her right knee provided moderate relief. She is here to discuss series of orthovisc injections .\par \par 12/17/21: patient presents with ongoing left knee pain. Previous orthovisc injection provided mild relief for the patient. She is here to discuss second orthovisc injections.\par \par 12/27/21: Patient presents with ongoing left knee pain. her previous orthovisc injection provided moderate relief for the patient. States no negative reactions. She is here to discuss third orthovisc injection.\par \par 4/8/22: Patient presents with new onset of left foot swelling x1 week. She denies any pain with walking or weight bearing. She was seen at [facility], by her PCP and Dr. Frankenberger who could not diagnose her left foot swelling. No swelling to her right foot. She soaked her foot in epsom salt and elevated it with no relief. \par \par 6/27/22: Patient presents for repeat evaluation of left knee pain. She experienced previous relief with previous series of orthovisc. Patient continues to have pain worsening over the last few days. She presents today to discuss first injection of orthovisc.\par 7/8/22: Patient presents for repeat evaluation of left knee pain. She admits mild relief from first injection of orthovisc. Denies any negative reactions. She presents to discuss second injection. \par 7/18/22: Patient presents for repeat evaluation of left knee pain. She admits moderate relief from second injection of orthovisc. Denies any negative reactions. She presents to discuss third injection. \par \par 9/19/22: Patient presents for treatment of left knee pain.  She is having surgery in 1 week on her neck and is having difficulty ambulating. Adult

## 2023-12-12 ENCOUNTER — APPOINTMENT (OUTPATIENT)
Dept: ORTHOPEDIC SURGERY | Facility: CLINIC | Age: 73
End: 2023-12-12
Payer: MEDICARE

## 2023-12-12 VITALS — HEIGHT: 60 IN | BODY MASS INDEX: 33.38 KG/M2 | WEIGHT: 170 LBS

## 2023-12-12 DIAGNOSIS — S52.592A OTHER FRACTURES OF LOWER END OF LEFT RADIUS, INITIAL ENCOUNTER FOR CLOSED FRACTURE: ICD-10-CM

## 2023-12-12 PROCEDURE — 99214 OFFICE O/P EST MOD 30 MIN: CPT

## 2023-12-12 RX ORDER — ONDANSETRON 4 MG/1
4 TABLET, ORALLY DISINTEGRATING ORAL
Qty: 30 | Refills: 0 | Status: ACTIVE | COMMUNITY
Start: 2023-12-12 | End: 1900-01-01

## 2023-12-18 ENCOUNTER — APPOINTMENT (OUTPATIENT)
Dept: ORTHOPEDIC SURGERY | Facility: HOSPITAL | Age: 73
End: 2023-12-18
Payer: MEDICARE

## 2023-12-18 PROCEDURE — 25608 OPTX DST RD XART FX/EPI SEP2: CPT | Mod: AS,78,LT

## 2023-12-27 ENCOUNTER — APPOINTMENT (OUTPATIENT)
Dept: ORTHOPEDIC SURGERY | Facility: CLINIC | Age: 73
End: 2023-12-27

## 2024-01-02 ENCOUNTER — APPOINTMENT (OUTPATIENT)
Dept: ORTHOPEDIC SURGERY | Facility: CLINIC | Age: 74
End: 2024-01-02
Payer: MEDICARE

## 2024-01-02 DIAGNOSIS — S52.592D OTHER FRACTURES OF LOWER END OF LEFT RADIUS, SUBSEQUENT ENCOUNTER FOR CLOSED FRACTURE WITH ROUTINE HEALING: ICD-10-CM

## 2024-01-02 PROCEDURE — 99024 POSTOP FOLLOW-UP VISIT: CPT

## 2024-01-02 PROCEDURE — 73110 X-RAY EXAM OF WRIST: CPT | Mod: LT

## 2024-01-02 PROCEDURE — L3908: CPT | Mod: KX,LT

## 2024-01-02 NOTE — HISTORY OF PRESENT ILLNESS
[Sudden] : sudden [2] : 2 [Dull/Aching] : dull/aching [Throbbing] : throbbing [Intermittent] : intermittent [Household chores] : household chores [Leisure] : leisure [Social interactions] : social interactions [Rest] : rest [Retired] : Work status: retired [de-identified] : Patient here for left wrist. Patient had ORIF distal radius FX 2 part on 12/18/2023. Patient denies fever, chills or SOB. Patient state she is taking Oxycodone for pain. Patient states she has minimal pain.  [] : no [FreeTextEntry1] : Left wrist  [FreeTextEntry3] : 12/11/2023 [FreeTextEntry5] : Trip and fall down the stairs  [de-identified] : Movement  [de-identified] : 12/18/2023 [de-identified] : ORIF distal radius FX 2 part

## 2024-01-02 NOTE — ASSESSMENT
[FreeTextEntry1] : 74 yo RHD female presenting s/p ORIF comminuted left 2 part distal radius fracture on 12/18/23. Patient denies fever/chills/CP/SOB. X-rays demonstrating well positioned ortho hardware, fracture is in acceptable alignment. -IDA CAPPS in wrist brace, given in office today. Patient denies wrist brace in the past 5 years under Medicare -Rx OT, demonstrated HEP of moving wrist and fingers -Avoid strenuous/impact related activities -Rest, ice, compression, elevation, NSAIDs PRN for pain.  -All questions answered -F/u with hand surgeon in [place] in 1 month with repeat x-rays  The diagnosis was explained in detail. The potential non-surgical and surgical treatments were reviewed. The relative risks and benefits of each option were considered relative to the patients age, activity level, medical history, symptom severity and previously attempted treatments.  The patient was advised to consult with their primary medical provider prior to initiation of any new medications to reduce the risk of adverse effects specific to their long-term home medications and medical history. The risk of gastrointestinal irritation and kidney injury specific to long-term NSAID use was discussed.  Entered by Oumar Rahman PA-C acting as scribe. Dr. Etienne Attestation The documentation recorded by the scribe, in my presence, accurately reflects the service I, Dr. Etienne, personally performed, and the decisions made by me with my edits as appropriate. depression

## 2024-01-02 NOTE — PHYSICAL EXAM
[Left] : left hand [Dorsal Wrist] : dorsal wrist [Volar Wrist] : volar wrist [NL (90)] : supination 90 degrees [4___] : pinch 4[unfilled]/5 [de-identified] : X-rays of left wrist 3 views: s/p ORIF comminuted 2 part distal radius fracture, fracture is in acceptable alignment, no evidence of hardware failure [] : no sign of infection [TWNoteComboBox7] : dorsiflexion 10 degrees [TWNoteComboBox4] : volarflexion 10 degrees [de-identified] : radial deviation 10 degrees [TWNoteComboBox9] : ulnar deviation 10 degrees

## 2024-02-06 ENCOUNTER — APPOINTMENT (OUTPATIENT)
Dept: ORTHOPEDIC SURGERY | Facility: HOSPITAL | Age: 74
End: 2024-02-06

## 2024-05-06 ENCOUNTER — APPOINTMENT (OUTPATIENT)
Dept: ORTHOPEDIC SURGERY | Facility: CLINIC | Age: 74
End: 2024-05-06
Payer: MEDICARE

## 2024-05-06 DIAGNOSIS — M75.111 INCOMPLETE ROTATOR CUFF TEAR OR RUPTURE OF RIGHT SHOULDER, NOT SPECIFIED AS TRAUMATIC: ICD-10-CM

## 2024-05-06 DIAGNOSIS — Z96.612 PRESENCE OF LEFT ARTIFICIAL SHOULDER JOINT: ICD-10-CM

## 2024-05-06 DIAGNOSIS — Z96.611 PRESENCE OF RIGHT ARTIFICIAL SHOULDER JOINT: ICD-10-CM

## 2024-05-06 DIAGNOSIS — M75.51 BURSITIS OF RIGHT SHOULDER: ICD-10-CM

## 2024-05-06 PROCEDURE — 73030 X-RAY EXAM OF SHOULDER: CPT | Mod: RT

## 2024-05-06 PROCEDURE — 99214 OFFICE O/P EST MOD 30 MIN: CPT

## 2024-05-06 RX ORDER — METHYLPREDNISOLONE 4 MG/1
4 TABLET ORAL
Qty: 1 | Refills: 0 | Status: ACTIVE | COMMUNITY
Start: 2024-05-06 | End: 1900-01-01

## 2024-05-06 NOTE — HISTORY OF PRESENT ILLNESS
[de-identified] : 70-year-old female who underwent reverse shoulder arthroplasty proximally 5 months ago. She had 2 previous rotator cuff repairs by Dr. Etienne which restored. Patient reports that prior to her reverse shoulder arthroplasty she had pain at rest and severe pain with trying to use the arm. Pain did not radiate into the hands and fingers. Patient had no diagnosis of herniated disc in the cervical region. Patient notes significant pain after the reverse shoulder arthroplasty which has been getting significantly worse. ESR and CRP were within normal limits. She denies fevers sweats chills erythema or wound dehiscence issues. Patient's  who is a personal friend brought her in for a second opinion regarding the etiology of her pain. Of note she did have a CT scan done which was read as posterior inferior glenoid fracture as well as lateral calcar humerus fracture.  The patient is Right handed. 7/28/20: patient returns today for follow up of ongoing left shoulder pain. Her pain is now constant and severe. She continues to report difficulty with activities of daily living. SHe presents to discuss surgery. 8/14/20: patient presents 10 days s/o left revision reverse shoulder arthroplasty with removal of previous rotator cuff anchor.  8/28/20: patient presents 4 weeks s/o left revision reverse shoulder arthroplasty with removal of previous rotator cuff anchor. The patient is doing well post operatively. She also presents with additional complaint of left knee pain. She has history of OA and reports recent exacerbation over the last 2-3 weeks. Pain is worse with walking long distances. 9/11/20: Patient returns today for follow up of left knee pain. Previous cortisone injection did not provide significant relief. She continues to report difficulty. She is interested in discussing further treatment options. patient is also recovering from left revision reverse shoulder arthroplasty. Some discomfort in the anterolateral aspect of the shoulder. 9/18/20: Patient returns today for follow up of left knee pain. She reports mild relief thus far from the first of her lubricating injections. She presents today for the 2nd of those injections. 9/25/20: Patient returns today for follow up of left knee pain. She reports mild relief thus far from her lubricating injections. She presents today for the 3rd of those injections. 10/5/20: Patient returns today for follow up regarding her neck and left shoulder pain. She denies any discrete trauma. She reports a significant atraumatic exacerbation of her pain after the weekend. She has been wearing a sling for immobilization of her LUE.  2/26/21: 6 months status post revision left reverse arthroplasty doing well. Also bilateral knee pain. She had an exacerbation of superior and lateral pain. She is exacerbation of neck pain and radicular symptoms.  5/24/21: Patient presents today for evaluation for recent left knee exacerbation. patient was last treated with orthovisc more than 6 months ago. She did report moderate improvement. Currently pain is constant. Pain is to medial aspect of her knee.  5/28/21: patient reports Orthovisc to her left knee last visit helped. She is here to discuss the possibility of a second injection.  6/4/21: patient presents to discuss her ongoing worsening left knee pain. She is here to discuss the possibility of proceeding with third Orthovisc injection to left knee.  8/9/21:patient reports that she was in the hospital with sepsis. Even prior to that however her shoulder has become increasingly more painful in the anterior aspect of the shoulder. She did have dramatic relief with prior epidural steroid injection.  8/16/21: patient reports a flareup of pain just lateral to the sternum on the left side I got worse with therapy. She is moving her left shoulder fluidly.  12/3/21: Patient returns with left knee pain. States a constant and severe pain. With respect to her neck and shoulder, she has been seeing Dr. Frankenberger for epidural injections and has no alleviation of pain.  12/10/21: Patient presents with ongoing left knee pain. Previous glenohumeral injection to her right knee provided moderate relief. She is here to discuss series of orthovisc injections .  12/17/21: patient presents with ongoing left knee pain. Previous orthovisc injection provided mild relief for the patient. She is here to discuss second orthovisc injections.  12/27/21: Patient presents with ongoing left knee pain. her previous orthovisc injection provided moderate relief for the patient. States no negative reactions. She is here to discuss third orthovisc injection.  4/8/22: Patient presents with new onset of left foot swelling x1 week. She denies any pain with walking or weight bearing. She was seen at [facility], by her PCP and Dr. Frankenberger who could not diagnose her left foot swelling. No swelling to her right foot. She soaked her foot in epsom salt and elevated it with no relief.   6/27/22: Patient presents for repeat evaluation of left knee pain. She experienced previous relief with previous series of orthovisc. Patient continues to have pain worsening over the last few days. She presents today to discuss first injection of orthovisc. 7/8/22: Patient presents for repeat evaluation of left knee pain. She admits mild relief from first injection of orthovisc. Denies any negative reactions. She presents to discuss second injection.  7/18/22: Patient presents for repeat evaluation of left knee pain. She admits moderate relief from second injection of orthovisc. Denies any negative reactions. She presents to discuss third injection.   9/19/22: Patient presents for treatment of left knee pain.  She is having surgery in 1 week on her neck and is having difficulty ambulating. 12/5/22: Patient presents for repeat evaluation of left knee pain. She states overall relief from previous series of orthovisc injections. She presents to discuss cortisone injection.   2/10/23: Patient presents for evaluation of left shoulder pain for 3 months. Patient denies new injuries or traumas. She is unable to raise her arm without pain.  4/28/23 Pt returns for follow up left shoulder. Pt reports she was in [place] and reports significant pain in her anterior left shoulder. Pain has prevented her from sleeping properly. She reports decrease ROM.  Patient reports that she used her left upper extremity a lot more than typical recently.  9/25/23: Patient here for a new complaint of right shoulder pain. Patient admits to difficulty with abduction movements  10/20/23: Patient here for right shoulder pain. Patient was at the grocery store and put something heavy in the cart and felt a pain. 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  5/6/24: Patient here for right shoulder pain. Patient is 3.5 months removed from right reverse shoulder arthroplasty that was done in [place] by Dr. Giraldo.  Patient is doing well until she bumped her anterior deltoid and aggravated her extremity.

## 2024-05-06 NOTE — DATA REVIEWED
[FreeTextEntry1] : MRI right shoulder 10/16/23 [facility]  Progressive rotator cuff tear now with a massive full-thickness tear involving supraspinatus and infraspinatus tendons as well as a near complete tear of subscapularis tendon. Humeral head is high riding and there is partial fatty atrophy of the rotator cuff musculature.  Complete tear and distal retraction long head of the biceps tendon.  Moderate glenohumeral and AC joint arthrosis.

## 2024-05-06 NOTE — PHYSICAL EXAM
[Right] : right shoulder [Type 2 acromion] : Type 2 acromion [Components well fixed, in good position] : Components well fixed, in good position [de-identified] : Constitutional: The general appearance of the patient is well developed, well nourished, no deformities and well groomed. Normal  Gait: Gait and function is as follows: normal gait.  Skin: Head and neck visualized skin is normal. Left upper extremity visualized skin is normal. Right upper extremity visualized skin is normal. Thoracic Skin of the thoracic spine shows visualized skin is normal.  Cardiovascular: palpable radial pulse bilaterally, good capillary refill in digits of the bilateral upper extremities and no temperature or color changes in the bilateral upper extremities.  Lymphatic: Normal Palpation of lymph nodes in the cervical.  Neurologic: fine motor control in the bilateral upper extremities is intact. Deep Tendon Reflexes in Upper and Lower Extremities Negative Mathew's in the bilateral upper extremities. The patient is oriented to time, place and person. Sensation to light touch intact in the bilateral upper extremities. Mood and Affect is normal.  Right Shoulder: Inspection of the shoulder/upper arm is as follows: no scapula winging, no biceps deformity and no AC joint deformity. Palpation of the shoulder/upper arm is as follows: There is tenderness at the proximal biceps tendon. Range of motion of the shoulder is as follows: Pain with internal rotation, external rotation, abduction and forward flexion. Strength of the shoulder is as follows: Supraspinatus 4/5. External Rotation 4/5. Internal Rotation 4/5. Deltoid 5/5 Ligament Stability and Special Tests of the shoulder is as follows: Neer test is positive. Anderson' test is positive. Speed's test is positive  Left Shoulder: Inspection of the shoulder/upper arm is as follows: There is a full, pain-free, stable range of motion of the shoulder with normal strength and no tenderness to palpation.  Neck:  Inspection / Palpation of the cervical spine is as follows: mild paracervical tenderness. Range of motion of the cervical spine is as follows: moderately decreased range of motion of the cervical spine. Stability testing for the cervical spine is as follows Stable range of motion.  Back, including spine: Inspection / Palpation of the thoracic/lumbar spine is as follows: There is a full, pain free, stable range of motion of the thoracic spine with a normal tone and not tenderness to palpation..

## 2024-05-06 NOTE — DISCUSSION/SUMMARY
[Medication Risks Reviewed] : Medication risks reviewed [de-identified] : 73-year-old female who is now more than one year removed from left reverse shoulder arthroplasty was done as a revision. Patient had a right reverse shoulder arthroplasty in [place] 1/22/24 X-ray demonstrates components intact Supine Ff shown to maximize range of motion MDP prescribed  Advised patient to home exercise program instead of formal physical therapy and gradually advance as tolerated. Could consider MRI in 6 to 8 weeks if there is no significant improvement.  (1) We discussed a comprehensive treatment plans that included a prescription management plan involving the use of prescription strength medications to include Ibuprofen 600- 800 mg TID, versus 500- 650 mg Tylenol. We also discussed prescribing topical diclofenac (Voltaren gel) as well as once daily Meloxicam 15 mg. (2) The patient has More Than One chronic injuries/illnesses as outlined, discussed, and documented by ICD 10 codes listed, as well as the HPI and Plan section. There is a moderate risk of morbidity with further treatment, especially from use of prescription strength medications and possible side effects of these medications which include upset stomach and cardiac/renal issues with long term use were discussed. (3) I recommended that the patient follow-up with their medical physician to discuss any significant specific potential issues with long term use such as interactions with current medications or with exacerbation of underlying medical morbidities.   Attestation: I, Adry OWENS'Berna, attest that this documentation has been prepared under the direction and in the presence of Provider Huber Bain MD. The documentation recorded by the scribe, in my presence, accurately reflects the service I personally performed, and the decisions made by me with my edits as appropriate. Huber Bain MD

## 2024-06-10 ENCOUNTER — RESULT REVIEW (OUTPATIENT)
Age: 74
End: 2024-06-10

## 2024-07-23 ENCOUNTER — APPOINTMENT (OUTPATIENT)
Dept: ORTHOPEDIC SURGERY | Facility: CLINIC | Age: 74
End: 2024-07-23

## 2024-07-25 ENCOUNTER — APPOINTMENT (OUTPATIENT)
Dept: ORTHOPEDIC SURGERY | Facility: CLINIC | Age: 74
End: 2024-07-25
Payer: MEDICARE

## 2024-07-25 DIAGNOSIS — R22.32 LOCALIZED SWELLING, MASS AND LUMP, LEFT UPPER LIMB: ICD-10-CM

## 2024-07-25 PROCEDURE — 99213 OFFICE O/P EST LOW 20 MIN: CPT

## 2024-07-25 NOTE — PHYSICAL EXAM
[Left] : left hand [NL (90)] : supination 90 degrees [de-identified] : X-rays of left wrist 3 views: s/p ORIF comminuted 2 part distal radius fracture, fracture is healed and in acceptable alignment, no evidence of hardware failure [Dorsal Hand] : dorsal hand [] : palpable mass or nodule [Soft Rubbery on Dorsal Wrist] : soft rubbery on dorsal wrist [Tender] : tender [4___] : grasp 4[unfilled]/5 [de-identified] : over the 5th extensor tendon within carpal region [TWNoteComboBox7] : dorsiflexion 50 degrees [TWNoteComboBox4] : volarflexion 60 degrees [de-identified] : radial deviation 20 degrees [TWNoteComboBox9] : ulnar deviation 40 degrees

## 2024-07-25 NOTE — HISTORY OF PRESENT ILLNESS
[Sudden] : sudden [Dull/Aching] : dull/aching [Throbbing] : throbbing [Intermittent] : intermittent [Household chores] : household chores [Leisure] : leisure [Social interactions] : social interactions [Rest] : rest [Retired] : Work status: retired [3] : 3 [0] : 0 [de-identified] : Patient here for left wrist. Patient had ORIF distal radius FX 2 part on 12/18/2023. Patient states he has no pain and no complaints. Patient states she has a soft lump that appeared on her wrist approx 2 weeks ago that hurts with ROM. Patient had x-ray at  on 7/12/2024.  [] : Post Surgical Visit: no [FreeTextEntry1] : Left wrist  [FreeTextEntry3] : 12/11/2023 [FreeTextEntry5] : Trip and fall down the stairs  [de-identified] : Movement  [de-identified] : 12/18/2023 [de-identified] : ORIF distal radius FX 2 part

## 2024-07-25 NOTE — ASSESSMENT
[FreeTextEntry1] : 74 yo RHD female presenting s/p ORIF comminuted left 2 part distal radius fracture on 12/18/23 with no postoperative complications and is pleased with her surgical outcome.  Now presenting with left hand mass along 5th extensor tendon on dorsum of left hand in the mid carpal region. No skin changes.  Patient denies constitutional symptoms  . X-rays demonstrating well positioned ortho hardware with healed fracture.  -will ordre MRI left hand with contrast to evaluate mass.  Patient denies adverse reactions to contrast and denies renal issues.  Patient would like to see hand specialist after MRI for possible surgical excision.  Will refer patient to Dr. Enriquez. -Rest, ice, compression, elevation, NSAIDs PRN for pain.  -All questions answered -F/u after MRI  The diagnosis was explained in detail. The potential non-surgical and surgical treatments were reviewed. The relative risks and benefits of each option were considered relative to the patients age, activity level, medical history, symptom severity and previously attempted treatments.  The patient was advised to consult with their primary medical provider prior to initiation of any new medications to reduce the risk of adverse effects specific to their long-term home medications and medical history. The risk of gastrointestinal irritation and kidney injury specific to long-term NSAID use was discussed.

## 2024-07-30 ENCOUNTER — RESULT REVIEW (OUTPATIENT)
Age: 74
End: 2024-07-30

## 2024-08-12 ENCOUNTER — APPOINTMENT (OUTPATIENT)
Dept: ORTHOPEDIC SURGERY | Facility: CLINIC | Age: 74
End: 2024-08-12
Payer: MEDICARE

## 2024-08-12 VITALS — BODY MASS INDEX: 33.38 KG/M2 | WEIGHT: 170 LBS | HEIGHT: 60 IN

## 2024-08-12 DIAGNOSIS — R22.32 LOCALIZED SWELLING, MASS AND LUMP, LEFT UPPER LIMB: ICD-10-CM

## 2024-08-12 PROCEDURE — 99214 OFFICE O/P EST MOD 30 MIN: CPT | Mod: 57

## 2024-08-12 PROCEDURE — 99204 OFFICE O/P NEW MOD 45 MIN: CPT | Mod: 57

## 2024-08-18 NOTE — HISTORY OF PRESENT ILLNESS
[de-identified] : ***JACE by Dr. Etienne*** Age: 74 year F PMHx: HTN Hand Dominance: RHD Chief Complaint: Left wrist s/p trauma 12/11/23. Patient reports that she had a mechanical trip and fall down a flight of stairs at her home on 12/11/23. Patient was seen at [facility] 12/11/23 where she had radiographs performed that showed a fracture. Patient had left distal radius ORIF [12/18/23] performed by Dr. Etienne. Patient has been following up with Dr. Etienne and reports progressing well in her healing process, but patient reports a new bump that appeared mid-july that is painful with ROM. Patient had MRI performed of her left wrist on 07/30/24 and was referred to [facility] to review results.  Trauma: 12/11/23 Outside Imaging/Treatment: MRI and radiographs from  07/30/24,  OTC Medications: none OT/PT: none Bracing: none Pain worse with: exertion, movement Pain better with: rest

## 2024-08-18 NOTE — IMAGING
[de-identified] : Left hand with 1.0 cm wide mass at ulnar aspect of hand. Firm, nontender. Able to fully flex and extend at MCP, PIP and DIP. Sensation intact at radial and ulnar aspects of pulp. <2sec cap refill. +ttp along ECU with mild swelling, no erythema.  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

## 2024-08-18 NOTE — HISTORY OF PRESENT ILLNESS
[de-identified] : ***JACE by Dr. Etienne*** Age: 74 year F PMHx: HTN Hand Dominance: RHD Chief Complaint: Left wrist s/p trauma 12/11/23. Patient reports that she had a mechanical trip and fall down a flight of stairs at her home on 12/11/23. Patient was seen at [facility] 12/11/23 where she had radiographs performed that showed a fracture. Patient had left distal radius ORIF [12/18/23] performed by Dr. Etienne. Patient has been following up with Dr. Etienne and reports progressing well in her healing process, but patient reports a new bump that appeared mid-july that is painful with ROM. Patient had MRI performed of her left wrist on 07/30/24 and was referred to [facility] to review results.  Trauma: 12/11/23 Outside Imaging/Treatment: MRI and radiographs from  07/30/24,  OTC Medications: none OT/PT: none Bracing: none Pain worse with: exertion, movement Pain better with: rest

## 2024-08-18 NOTE — HISTORY OF PRESENT ILLNESS
[de-identified] : ***JACE by Dr. Etienne*** Age: 74 year F PMHx: HTN Hand Dominance: RHD Chief Complaint: Left wrist s/p trauma 12/11/23. Patient reports that she had a mechanical trip and fall down a flight of stairs at her home on 12/11/23. Patient was seen at [facility] 12/11/23 where she had radiographs performed that showed a fracture. Patient had left distal radius ORIF [12/18/23] performed by Dr. Etienne. Patient has been following up with Dr. Etienne and reports progressing well in her healing process, but patient reports a new bump that appeared mid-july that is painful with ROM. Patient had MRI performed of her left wrist on 07/30/24 and was referred to [facility] to review results.  Trauma: 12/11/23 Outside Imaging/Treatment: MRI and radiographs from  07/30/24,  OTC Medications: none OT/PT: none Bracing: none Pain worse with: exertion, movement Pain better with: rest

## 2024-08-18 NOTE — IMAGING
[de-identified] : Left hand with 1.0 cm wide mass at ulnar aspect of hand. Firm, nontender. Able to fully flex and extend at MCP, PIP and DIP. Sensation intact at radial and ulnar aspects of pulp. <2sec cap refill. +ttp along ECU with mild swelling, no erythema.  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

## 2024-08-18 NOTE — IMAGING
[de-identified] : Left hand with 1.0 cm wide mass at ulnar aspect of hand. Firm, nontender. Able to fully flex and extend at MCP, PIP and DIP. Sensation intact at radial and ulnar aspects of pulp. <2sec cap refill. +ttp along ECU with mild swelling, no erythema.  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

## 2024-08-18 NOTE — ASSESSMENT
[FreeTextEntry1] : Left hand mass and advanced ECU tendonitis/tearing - reviewed radiographs and MRI with patient. We did discuss this is a presumed read per the MRI and that excisional biopsy would aid in diagnosis. Patient is indicated for left hand mass marginal excision. Risks, benefits and alternative discussed with patient and included an unexpected pathology result, recurrence, infection, wound healing problems, tendon injury, neurovascular injury, pain and stiffness. Patient understood this discussion, questions were answered and she would like to proceed. Will observe ECU tendon at this time.  Plan for left hand mass excision under LOCAL ONLY. [facility]  Follow-up 10 days thereafter.

## 2024-08-21 ENCOUNTER — APPOINTMENT (OUTPATIENT)
Dept: ORTHOPEDIC SURGERY | Facility: AMBULATORY SURGERY CENTER | Age: 74
End: 2024-08-21
Payer: MEDICARE

## 2024-08-21 PROCEDURE — 13131 CMPLX RPR F/C/C/M/N/AX/G/H/F: CPT | Mod: 59,LT

## 2024-08-21 PROCEDURE — 26111 EXC HAND LES SC 1.5 CM/>: CPT | Mod: LT

## 2024-09-06 ENCOUNTER — APPOINTMENT (OUTPATIENT)
Dept: ORTHOPEDIC SURGERY | Facility: CLINIC | Age: 74
End: 2024-09-06
Payer: MEDICARE

## 2024-09-06 ENCOUNTER — APPOINTMENT (OUTPATIENT)
Dept: ORTHOPEDIC SURGERY | Facility: CLINIC | Age: 74
End: 2024-09-06

## 2024-09-06 DIAGNOSIS — R22.32 LOCALIZED SWELLING, MASS AND LUMP, LEFT UPPER LIMB: ICD-10-CM

## 2024-09-06 PROCEDURE — 99024 POSTOP FOLLOW-UP VISIT: CPT

## 2024-09-06 NOTE — HISTORY OF PRESENT ILLNESS
[de-identified] : 09/06/2024: Patient presents s/p left hand mass excision on 08/21/24. Patient reports she is not in pain. Patient presents for suture removal. Patient denies fevers, chills, SOB, CP  ***JACE by Dr. Etienne*** Age: 74 year F PMHx: HTN Hand Dominance: RHD Chief Complaint: Left wrist s/p trauma 12/11/23. Patient reports that she had a mechanical trip and fall down a flight of stairs at her home on 12/11/23. Patient was seen at [facility] 12/11/23 where she had radiographs performed that showed a fracture. Patient had left distal radius ORIF [12/18/23] performed by Dr. Etienne. Patient has been following up with Dr. Etienne and reports progressing well in her healing process, but patient reports a new bump that appeared mid-july that is painful with ROM. Patient had MRI performed of her left wrist on 07/30/24 and was referred to [facility] to review results.  Trauma: 12/11/23 Outside Imaging/Treatment: MRI and radiographs from  07/30/24,  OTC Medications: none OT/PT: none Bracing: none Pain worse with: exertion, movement Pain better with: rest

## 2024-09-06 NOTE — IMAGING
[de-identified] : Incision in the lateral aspect of the left hand is clean, dry, intact. No sign of infection.  Sensation intact.  Full ROM. 4 sutures removed in office today. Bacitracin and band aid applied.

## 2024-09-06 NOTE — ASSESSMENT
[FreeTextEntry1] : Patient presents 16 days s/p left hand mass marginal excision.  - 4 sutures removed in office today. Bacitracin and band aid applied. - Patient reports she feels well at this time.   Follow up with Dr. Enriquez

## 2024-09-09 ENCOUNTER — APPOINTMENT (OUTPATIENT)
Dept: ORTHOPEDIC SURGERY | Facility: CLINIC | Age: 74
End: 2024-09-09

## 2024-09-18 ENCOUNTER — OFFICE (OUTPATIENT)
Dept: URBAN - METROPOLITAN AREA CLINIC 103 | Facility: CLINIC | Age: 74
Setting detail: OPHTHALMOLOGY
End: 2024-09-18
Payer: MEDICARE

## 2024-09-18 DIAGNOSIS — H35.3131: ICD-10-CM

## 2024-09-18 DIAGNOSIS — H35.033: ICD-10-CM

## 2024-09-18 PROCEDURE — 92004 COMPRE OPH EXAM NEW PT 1/>: CPT | Performed by: OPHTHALMOLOGY

## 2024-09-18 PROCEDURE — 92250 FUNDUS PHOTOGRAPHY W/I&R: CPT | Performed by: OPHTHALMOLOGY

## 2025-04-22 ENCOUNTER — OFFICE (OUTPATIENT)
Dept: URBAN - METROPOLITAN AREA CLINIC 103 | Facility: CLINIC | Age: 75
Setting detail: OPHTHALMOLOGY
End: 2025-04-22
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
## 2025-04-22 PROCEDURE — NO SHOW FE NO SHOW FEE: Performed by: OPHTHALMOLOGY
